# Patient Record
Sex: FEMALE | Race: ASIAN | NOT HISPANIC OR LATINO
[De-identification: names, ages, dates, MRNs, and addresses within clinical notes are randomized per-mention and may not be internally consistent; named-entity substitution may affect disease eponyms.]

---

## 2019-04-24 ENCOUNTER — RESULT REVIEW (OUTPATIENT)
Age: 29
End: 2019-04-24

## 2019-05-29 ENCOUNTER — APPOINTMENT (OUTPATIENT)
Dept: INTERNAL MEDICINE | Facility: CLINIC | Age: 29
End: 2019-05-29
Payer: COMMERCIAL

## 2019-05-29 ENCOUNTER — LABORATORY RESULT (OUTPATIENT)
Age: 29
End: 2019-05-29

## 2019-05-29 VITALS
SYSTOLIC BLOOD PRESSURE: 111 MMHG | WEIGHT: 123 LBS | HEIGHT: 62 IN | DIASTOLIC BLOOD PRESSURE: 76 MMHG | HEART RATE: 54 BPM | BODY MASS INDEX: 22.63 KG/M2 | OXYGEN SATURATION: 99 % | RESPIRATION RATE: 17 BRPM | TEMPERATURE: 98.7 F

## 2019-05-29 DIAGNOSIS — Z82.49 FAMILY HISTORY OF ISCHEMIC HEART DISEASE AND OTHER DISEASES OF THE CIRCULATORY SYSTEM: ICD-10-CM

## 2019-05-29 DIAGNOSIS — Z83.3 FAMILY HISTORY OF DIABETES MELLITUS: ICD-10-CM

## 2019-05-29 DIAGNOSIS — Z78.9 OTHER SPECIFIED HEALTH STATUS: ICD-10-CM

## 2019-05-29 DIAGNOSIS — Z00.00 ENCOUNTER FOR GENERAL ADULT MEDICAL EXAMINATION W/OUT ABNORMAL FINDINGS: ICD-10-CM

## 2019-05-29 DIAGNOSIS — E03.9 HYPOTHYROIDISM, UNSPECIFIED: ICD-10-CM

## 2019-05-29 DIAGNOSIS — Z83.438 FAMILY HISTORY OF OTHER DISORDER OF LIPOPROTEIN METABOLISM AND OTHER LIPIDEMIA: ICD-10-CM

## 2019-05-29 DIAGNOSIS — D22.9 MELANOCYTIC NEVI, UNSPECIFIED: ICD-10-CM

## 2019-05-29 PROCEDURE — 99385 PREV VISIT NEW AGE 18-39: CPT

## 2019-05-30 LAB
25(OH)D3 SERPL-MCNC: 21.3 NG/ML
ALBUMIN SERPL ELPH-MCNC: 4.1 G/DL
ALP BLD-CCNC: 70 U/L
ALT SERPL-CCNC: 12 U/L
ANION GAP SERPL CALC-SCNC: 10 MMOL/L
APPEARANCE: ABNORMAL
AST SERPL-CCNC: 18 U/L
BASOPHILS # BLD AUTO: 0.06 K/UL
BASOPHILS NFR BLD AUTO: 0.7 %
BILIRUB SERPL-MCNC: 0.4 MG/DL
BILIRUBIN URINE: NEGATIVE
BLOOD URINE: NEGATIVE
BUN SERPL-MCNC: 13 MG/DL
CALCIUM SERPL-MCNC: 9.4 MG/DL
CHLORIDE SERPL-SCNC: 103 MMOL/L
CHOLEST SERPL-MCNC: 214 MG/DL
CHOLEST/HDLC SERPL: 3.2 RATIO
CO2 SERPL-SCNC: 28 MMOL/L
COLOR: YELLOW
CREAT SERPL-MCNC: 0.75 MG/DL
EOSINOPHIL # BLD AUTO: 0.39 K/UL
EOSINOPHIL NFR BLD AUTO: 4.4 %
ESTIMATED AVERAGE GLUCOSE: 105 MG/DL
GLUCOSE QUALITATIVE U: NEGATIVE
GLUCOSE SERPL-MCNC: 84 MG/DL
HBA1C MFR BLD HPLC: 5.3 %
HCT VFR BLD CALC: 43.2 %
HDLC SERPL-MCNC: 67 MG/DL
HGB BLD-MCNC: 13.6 G/DL
IMM GRANULOCYTES NFR BLD AUTO: 0.5 %
KETONES URINE: NEGATIVE
LDLC SERPL CALC-MCNC: 126 MG/DL
LEUKOCYTE ESTERASE URINE: NEGATIVE
LYMPHOCYTES # BLD AUTO: 2.75 K/UL
LYMPHOCYTES NFR BLD AUTO: 31 %
MAN DIFF?: NORMAL
MCHC RBC-ENTMCNC: 29.1 PG
MCHC RBC-ENTMCNC: 31.5 GM/DL
MCV RBC AUTO: 92.5 FL
MONOCYTES # BLD AUTO: 0.64 K/UL
MONOCYTES NFR BLD AUTO: 7.2 %
NEUTROPHILS # BLD AUTO: 4.98 K/UL
NEUTROPHILS NFR BLD AUTO: 56.2 %
NITRITE URINE: NEGATIVE
PH URINE: 8.5
PLATELET # BLD AUTO: 300 K/UL
POTASSIUM SERPL-SCNC: 5 MMOL/L
PROT SERPL-MCNC: 6.7 G/DL
PROTEIN URINE: NORMAL
RBC # BLD: 4.67 M/UL
RBC # FLD: 13.4 %
SODIUM SERPL-SCNC: 141 MMOL/L
SPECIFIC GRAVITY URINE: 1.02
T3 SERPL-MCNC: 98 NG/DL
T3FREE SERPL-MCNC: 2.76 PG/ML
T4 FREE SERPL-MCNC: 1.8 NG/DL
T4 SERPL-MCNC: 9.3 UG/DL
TRIGL SERPL-MCNC: 104 MG/DL
TSH SERPL-ACNC: 1.08 UIU/ML
UROBILINOGEN URINE: NORMAL
WBC # FLD AUTO: 8.86 K/UL

## 2019-06-12 ENCOUNTER — TRANSCRIPTION ENCOUNTER (OUTPATIENT)
Age: 29
End: 2019-06-12

## 2019-09-30 ENCOUNTER — RX RENEWAL (OUTPATIENT)
Age: 29
End: 2019-09-30

## 2019-10-02 ENCOUNTER — RX RENEWAL (OUTPATIENT)
Age: 29
End: 2019-10-02

## 2020-02-18 ENCOUNTER — RX RENEWAL (OUTPATIENT)
Age: 30
End: 2020-02-18

## 2020-05-05 ENCOUNTER — APPOINTMENT (OUTPATIENT)
Dept: ALLERGY | Facility: CLINIC | Age: 30
End: 2020-05-05
Payer: COMMERCIAL

## 2020-05-05 VITALS
HEART RATE: 61 BPM | HEIGHT: 62 IN | WEIGHT: 125 LBS | SYSTOLIC BLOOD PRESSURE: 112 MMHG | DIASTOLIC BLOOD PRESSURE: 80 MMHG | BODY MASS INDEX: 23 KG/M2 | RESPIRATION RATE: 16 BRPM | OXYGEN SATURATION: 98 %

## 2020-05-05 DIAGNOSIS — J30.9 ALLERGIC RHINITIS, UNSPECIFIED: ICD-10-CM

## 2020-05-05 PROCEDURE — 95018 ALL TSTG PERQ&IQ DRUGS/BIOL: CPT

## 2020-05-05 PROCEDURE — 95004 PERQ TESTS W/ALRGNC XTRCS: CPT

## 2020-05-05 PROCEDURE — 95024 IQ TESTS W/ALLERGENIC XTRCS: CPT

## 2020-05-05 PROCEDURE — 99204 OFFICE O/P NEW MOD 45 MIN: CPT | Mod: 25

## 2020-05-05 NOTE — PHYSICAL EXAM
[Alert] : alert [Well Nourished] : well nourished [Healthy Appearance] : healthy appearance [No Acute Distress] : no acute distress [Well Developed] : well developed [No Discharge] : no discharge [Normal Pupil & Iris Size/Symmetry] : normal pupil and iris size and symmetry [Sclera Not Icteric] : sclera not icteric [Normal TMs] : both tympanic membranes were normal [Normal Nasal Mucosa] : the nasal mucosa was normal [Normal Lips/Tongue] : the lips and tongue were normal [Normal Tonsils] : normal tonsils [Normal Outer Ear/Nose] : the ears and nose were normal in appearance [No Thrush] : no thrush [Normal Dentition] : normal dentition [No Oral Lesions or Ulcers] : no oral lesions or ulcers [No Neck Mass] : no neck mass was observed [No Thyroid Mass] : no thyroid mass [No LAD] : no lymphadenopathy [Normal Rate and Effort] : normal respiratory rhythm and effort [Supple] : the neck was supple [Normal Palpation] : palpation of the chest revealed no abnormalities [No Crackles] : no crackles [No Retractions] : no retractions [Bilateral Audible Breath Sounds] : bilateral audible breath sounds [Normal Rate] : heart rate was normal  [Normal S1, S2] : normal S1 and S2 [No murmur] : no murmur [Regular Rhythm] : with a regular rhythm [Normal Cervical Lymph Nodes] : cervical [Skin Intact] : skin intact  [No Rash] : no rash [No Skin Lesions] : no skin lesions [No Joint Swelling or Erythema] : no joint swelling or erythema [No clubbing] : no clubbing [No Edema] : no edema [Normal Mood] : mood was normal [No Cyanosis] : no cyanosis [Normal Affect] : affect was normal [Alert, Awake, Oriented as Age-Appropriate] : alert, awake, oriented as age appropriate [Boggy Nasal Turbinates] : no boggy and/or pale nasal turbinates [Posterior Pharyngeal Cobblestoning] : no posterior pharyngeal cobblestoning

## 2020-05-05 NOTE — SOCIAL HISTORY
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

## 2020-05-05 NOTE — HISTORY OF PRESENT ILLNESS
[Asthma] : asthma [Venom Reactions] : venom reactions [Eczematous rashes] : eczematous rashes [Food Allergies] : food allergies [de-identified] : Patient got a shedding dog 1 month ago - after a bath she noted an itchy rash on hands and arms - ocular itching - facial rash with kissing the dog - rhinorrhea and coughing.    She improves with Zyrtec.   She notes mild wheezing.   She does recall SOB and wheeze in the past exposure to Addison Retriever.    She lives in an apartment.   Dog does come into her bedroom and goes on the bed.  \par \par With dust exposure and spring time - rhinorrhea - ocular itching.    \par \par Patient actively trying to become pregnant.

## 2020-05-05 NOTE — IMPRESSION
[Allergy Testing Dust Mite] : dust mites [] : molds [Allergy Testing Trees] : trees [Allergy Testing Cockroach] : cockroach [Allergy Testing Weeds] : weeds [Allergy Testing Cat] : cat [Allergy Testing Grasses] : grasses [Allergy Testing Dog] : dog [FreeTextEntry3] : dog

## 2020-05-05 NOTE — ASSESSMENT
[FreeTextEntry1] : Perennial and seasonal allergic rhinoconjunctivitis:\par \par Dog avoidance measures reviewed\par Zyrtec 10 mg QD\par Budesonide nasal spray 2 puffs QD\par Genteal eye cleanser prn \par Proair 2 puffs QID prn

## 2020-06-25 ENCOUNTER — RESULT REVIEW (OUTPATIENT)
Age: 30
End: 2020-06-25

## 2020-08-19 ENCOUNTER — APPOINTMENT (OUTPATIENT)
Dept: ANTEPARTUM | Facility: CLINIC | Age: 30
End: 2020-08-19
Payer: COMMERCIAL

## 2020-08-19 ENCOUNTER — ASOB RESULT (OUTPATIENT)
Age: 30
End: 2020-08-19

## 2020-08-19 ENCOUNTER — LABORATORY RESULT (OUTPATIENT)
Age: 30
End: 2020-08-19

## 2020-08-19 PROCEDURE — 76813 OB US NUCHAL MEAS 1 GEST: CPT

## 2020-08-19 PROCEDURE — 76801 OB US < 14 WKS SINGLE FETUS: CPT

## 2020-08-19 PROCEDURE — 36416 COLLJ CAPILLARY BLOOD SPEC: CPT

## 2020-09-18 ENCOUNTER — APPOINTMENT (OUTPATIENT)
Dept: ANTEPARTUM | Facility: CLINIC | Age: 30
End: 2020-09-18
Payer: COMMERCIAL

## 2020-09-18 ENCOUNTER — ASOB RESULT (OUTPATIENT)
Age: 30
End: 2020-09-18

## 2020-09-18 PROCEDURE — 76817 TRANSVAGINAL US OBSTETRIC: CPT

## 2020-09-18 PROCEDURE — 76805 OB US >/= 14 WKS SNGL FETUS: CPT

## 2020-10-08 ENCOUNTER — APPOINTMENT (OUTPATIENT)
Dept: ANTEPARTUM | Facility: CLINIC | Age: 30
End: 2020-10-08
Payer: COMMERCIAL

## 2020-10-08 ENCOUNTER — ASOB RESULT (OUTPATIENT)
Age: 30
End: 2020-10-08

## 2020-10-08 PROCEDURE — 76811 OB US DETAILED SNGL FETUS: CPT

## 2020-10-08 PROCEDURE — 36415 COLL VENOUS BLD VENIPUNCTURE: CPT

## 2020-10-08 PROCEDURE — 76817 TRANSVAGINAL US OBSTETRIC: CPT

## 2020-12-03 ENCOUNTER — ASOB RESULT (OUTPATIENT)
Age: 30
End: 2020-12-03

## 2020-12-03 ENCOUNTER — APPOINTMENT (OUTPATIENT)
Dept: ANTEPARTUM | Facility: CLINIC | Age: 30
End: 2020-12-03
Payer: COMMERCIAL

## 2020-12-03 PROCEDURE — 99072 ADDL SUPL MATRL&STAF TM PHE: CPT

## 2020-12-03 PROCEDURE — 76816 OB US FOLLOW-UP PER FETUS: CPT

## 2020-12-07 ENCOUNTER — ASOB RESULT (OUTPATIENT)
Age: 30
End: 2020-12-07

## 2020-12-07 ENCOUNTER — APPOINTMENT (OUTPATIENT)
Dept: MATERNAL FETAL MEDICINE | Facility: CLINIC | Age: 30
End: 2020-12-07
Payer: COMMERCIAL

## 2020-12-07 DIAGNOSIS — O99.810 ABNORMAL GLUCOSE COMPLICATING PREGNANCY: ICD-10-CM

## 2020-12-07 PROCEDURE — G0109 DIAB MANAGE TRN IND/GROUP: CPT | Mod: 95

## 2020-12-21 ENCOUNTER — ASOB RESULT (OUTPATIENT)
Age: 30
End: 2020-12-21

## 2020-12-21 ENCOUNTER — APPOINTMENT (OUTPATIENT)
Dept: MATERNAL FETAL MEDICINE | Facility: CLINIC | Age: 30
End: 2020-12-21
Payer: COMMERCIAL

## 2020-12-21 PROCEDURE — G0108 DIAB MANAGE TRN  PER INDIV: CPT | Mod: 95

## 2021-01-11 ENCOUNTER — APPOINTMENT (OUTPATIENT)
Dept: MATERNAL FETAL MEDICINE | Facility: CLINIC | Age: 31
End: 2021-01-11
Payer: COMMERCIAL

## 2021-01-11 ENCOUNTER — ASOB RESULT (OUTPATIENT)
Age: 31
End: 2021-01-11

## 2021-01-11 PROCEDURE — G0108 DIAB MANAGE TRN  PER INDIV: CPT | Mod: 95

## 2021-01-14 ENCOUNTER — ASOB RESULT (OUTPATIENT)
Age: 31
End: 2021-01-14

## 2021-01-14 ENCOUNTER — APPOINTMENT (OUTPATIENT)
Dept: ANTEPARTUM | Facility: CLINIC | Age: 31
End: 2021-01-14
Payer: COMMERCIAL

## 2021-01-14 PROCEDURE — 76816 OB US FOLLOW-UP PER FETUS: CPT

## 2021-01-14 PROCEDURE — 99072 ADDL SUPL MATRL&STAF TM PHE: CPT

## 2021-02-01 ENCOUNTER — ASOB RESULT (OUTPATIENT)
Age: 31
End: 2021-02-01

## 2021-02-01 ENCOUNTER — APPOINTMENT (OUTPATIENT)
Dept: MATERNAL FETAL MEDICINE | Facility: CLINIC | Age: 31
End: 2021-02-01
Payer: COMMERCIAL

## 2021-02-01 PROCEDURE — G0108 DIAB MANAGE TRN  PER INDIV: CPT | Mod: 95

## 2021-02-05 ENCOUNTER — INPATIENT (INPATIENT)
Facility: HOSPITAL | Age: 31
LOS: 0 days | Discharge: HOME | End: 2021-02-06
Attending: OBSTETRICS & GYNECOLOGY | Admitting: OBSTETRICS & GYNECOLOGY
Payer: COMMERCIAL

## 2021-02-05 VITALS — DIASTOLIC BLOOD PRESSURE: 82 MMHG | HEART RATE: 63 BPM | SYSTOLIC BLOOD PRESSURE: 126 MMHG | TEMPERATURE: 98 F

## 2021-02-05 DIAGNOSIS — E89.0 POSTPROCEDURAL HYPOTHYROIDISM: Chronic | ICD-10-CM

## 2021-02-05 DIAGNOSIS — O26.899 OTHER SPECIFIED PREGNANCY RELATED CONDITIONS, UNSPECIFIED TRIMESTER: ICD-10-CM

## 2021-02-05 DIAGNOSIS — Z3A.00 WEEKS OF GESTATION OF PREGNANCY NOT SPECIFIED: ICD-10-CM

## 2021-02-05 DIAGNOSIS — Z34.80 ENCOUNTER FOR SUPERVISION OF OTHER NORMAL PREGNANCY, UNSPECIFIED TRIMESTER: ICD-10-CM

## 2021-02-05 LAB
BASOPHILS # BLD AUTO: 0.04 K/UL — SIGNIFICANT CHANGE UP (ref 0–0.2)
BASOPHILS NFR BLD AUTO: 0.4 % — SIGNIFICANT CHANGE UP (ref 0–2)
BLD GP AB SCN SERPL QL: POSITIVE — SIGNIFICANT CHANGE UP
EOSINOPHIL # BLD AUTO: 0.1 K/UL — SIGNIFICANT CHANGE UP (ref 0–0.5)
EOSINOPHIL NFR BLD AUTO: 0.9 % — SIGNIFICANT CHANGE UP (ref 0–6)
GLUCOSE BLDC GLUCOMTR-MCNC: 79 MG/DL — SIGNIFICANT CHANGE UP (ref 70–99)
GLUCOSE BLDC GLUCOMTR-MCNC: 83 MG/DL — SIGNIFICANT CHANGE UP (ref 70–99)
GLUCOSE BLDC GLUCOMTR-MCNC: 92 MG/DL — SIGNIFICANT CHANGE UP (ref 70–99)
HCT VFR BLD CALC: 42.1 % — SIGNIFICANT CHANGE UP (ref 34.5–45)
HGB BLD-MCNC: 14.1 G/DL — SIGNIFICANT CHANGE UP (ref 11.5–15.5)
IMM GRANULOCYTES NFR BLD AUTO: 0.5 % — SIGNIFICANT CHANGE UP (ref 0–1.5)
LYMPHOCYTES # BLD AUTO: 29.4 % — SIGNIFICANT CHANGE UP (ref 13–44)
LYMPHOCYTES # BLD AUTO: 3.13 K/UL — SIGNIFICANT CHANGE UP (ref 1–3.3)
MCHC RBC-ENTMCNC: 28.7 PG — SIGNIFICANT CHANGE UP (ref 27–34)
MCHC RBC-ENTMCNC: 33.5 GM/DL — SIGNIFICANT CHANGE UP (ref 32–36)
MCV RBC AUTO: 85.6 FL — SIGNIFICANT CHANGE UP (ref 80–100)
MONOCYTES # BLD AUTO: 0.66 K/UL — SIGNIFICANT CHANGE UP (ref 0–0.9)
MONOCYTES NFR BLD AUTO: 6.2 % — SIGNIFICANT CHANGE UP (ref 2–14)
NEUTROPHILS # BLD AUTO: 6.67 K/UL — SIGNIFICANT CHANGE UP (ref 1.8–7.4)
NEUTROPHILS NFR BLD AUTO: 62.6 % — SIGNIFICANT CHANGE UP (ref 43–77)
NRBC # BLD: 0 /100 WBCS — SIGNIFICANT CHANGE UP (ref 0–0)
PLATELET # BLD AUTO: 288 K/UL — SIGNIFICANT CHANGE UP (ref 150–400)
RBC # BLD: 4.92 M/UL — SIGNIFICANT CHANGE UP (ref 3.8–5.2)
RBC # FLD: 13.5 % — SIGNIFICANT CHANGE UP (ref 10.3–14.5)
RH IG SCN BLD-IMP: NEGATIVE — SIGNIFICANT CHANGE UP
RH IG SCN BLD-IMP: NEGATIVE — SIGNIFICANT CHANGE UP
SARS-COV-2 IGG SERPL QL IA: NEGATIVE — SIGNIFICANT CHANGE UP
SARS-COV-2 IGM SERPL IA-ACNC: 0.67 INDEX — SIGNIFICANT CHANGE UP
SARS-COV-2 RNA SPEC QL NAA+PROBE: SIGNIFICANT CHANGE UP
T PALLIDUM AB TITR SER: NEGATIVE — SIGNIFICANT CHANGE UP
WBC # BLD: 10.65 K/UL — HIGH (ref 3.8–10.5)
WBC # FLD AUTO: 10.65 K/UL — HIGH (ref 3.8–10.5)

## 2021-02-05 PROCEDURE — 86077 PHYS BLOOD BANK SERV XMATCH: CPT

## 2021-02-05 RX ORDER — DIPHENHYDRAMINE HCL 50 MG
25 CAPSULE ORAL EVERY 6 HOURS
Refills: 0 | Status: DISCONTINUED | OUTPATIENT
Start: 2021-02-05 | End: 2021-02-06

## 2021-02-05 RX ORDER — LEVOTHYROXINE SODIUM 125 MCG
75 TABLET ORAL
Refills: 0 | Status: DISCONTINUED | OUTPATIENT
Start: 2021-02-05 | End: 2021-02-06

## 2021-02-05 RX ORDER — OXYTOCIN 10 UNIT/ML
333.33 VIAL (ML) INJECTION
Qty: 20 | Refills: 0 | Status: DISCONTINUED | OUTPATIENT
Start: 2021-02-05 | End: 2021-02-06

## 2021-02-05 RX ORDER — ACETAMINOPHEN 500 MG
975 TABLET ORAL
Refills: 0 | Status: DISCONTINUED | OUTPATIENT
Start: 2021-02-05 | End: 2021-02-06

## 2021-02-05 RX ORDER — OXYTOCIN 10 UNIT/ML
2 VIAL (ML) INJECTION
Qty: 30 | Refills: 0 | Status: DISCONTINUED | OUTPATIENT
Start: 2021-02-05 | End: 2021-02-06

## 2021-02-05 RX ORDER — SODIUM CHLORIDE 9 MG/ML
1000 INJECTION, SOLUTION INTRAVENOUS
Refills: 0 | Status: DISCONTINUED | OUTPATIENT
Start: 2021-02-05 | End: 2021-02-06

## 2021-02-05 RX ORDER — HYDROCORTISONE 1 %
1 OINTMENT (GRAM) TOPICAL EVERY 6 HOURS
Refills: 0 | Status: DISCONTINUED | OUTPATIENT
Start: 2021-02-05 | End: 2021-02-06

## 2021-02-05 RX ORDER — LANOLIN
1 OINTMENT (GRAM) TOPICAL EVERY 6 HOURS
Refills: 0 | Status: DISCONTINUED | OUTPATIENT
Start: 2021-02-05 | End: 2021-02-06

## 2021-02-05 RX ORDER — SODIUM CHLORIDE 9 MG/ML
3 INJECTION INTRAMUSCULAR; INTRAVENOUS; SUBCUTANEOUS EVERY 8 HOURS
Refills: 0 | Status: DISCONTINUED | OUTPATIENT
Start: 2021-02-05 | End: 2021-02-06

## 2021-02-05 RX ORDER — SODIUM CHLORIDE 9 MG/ML
1000 INJECTION, SOLUTION INTRAVENOUS
Refills: 0 | Status: DISCONTINUED | OUTPATIENT
Start: 2021-02-05 | End: 2021-02-05

## 2021-02-05 RX ORDER — IBUPROFEN 200 MG
600 TABLET ORAL EVERY 6 HOURS
Refills: 0 | Status: COMPLETED | OUTPATIENT
Start: 2021-02-05 | End: 2022-01-04

## 2021-02-05 RX ORDER — OXYCODONE HYDROCHLORIDE 5 MG/1
5 TABLET ORAL ONCE
Refills: 0 | Status: DISCONTINUED | OUTPATIENT
Start: 2021-02-05 | End: 2021-02-06

## 2021-02-05 RX ORDER — KETOROLAC TROMETHAMINE 30 MG/ML
30 SYRINGE (ML) INJECTION ONCE
Refills: 0 | Status: DISCONTINUED | OUTPATIENT
Start: 2021-02-05 | End: 2021-02-05

## 2021-02-05 RX ORDER — LEVOTHYROXINE SODIUM 125 MCG
150 TABLET ORAL
Refills: 0 | Status: DISCONTINUED | OUTPATIENT
Start: 2021-02-05 | End: 2021-02-06

## 2021-02-05 RX ORDER — AER TRAVELER 0.5 G/1
1 SOLUTION RECTAL; TOPICAL EVERY 4 HOURS
Refills: 0 | Status: DISCONTINUED | OUTPATIENT
Start: 2021-02-05 | End: 2021-02-06

## 2021-02-05 RX ORDER — CITRIC ACID/SODIUM CITRATE 300-500 MG
15 SOLUTION, ORAL ORAL EVERY 6 HOURS
Refills: 0 | Status: DISCONTINUED | OUTPATIENT
Start: 2021-02-05 | End: 2021-02-05

## 2021-02-05 RX ORDER — IBUPROFEN 200 MG
600 TABLET ORAL EVERY 6 HOURS
Refills: 0 | Status: DISCONTINUED | OUTPATIENT
Start: 2021-02-05 | End: 2021-02-06

## 2021-02-05 RX ORDER — TETANUS TOXOID, REDUCED DIPHTHERIA TOXOID AND ACELLULAR PERTUSSIS VACCINE, ADSORBED 5; 2.5; 8; 8; 2.5 [IU]/.5ML; [IU]/.5ML; UG/.5ML; UG/.5ML; UG/.5ML
0.5 SUSPENSION INTRAMUSCULAR ONCE
Refills: 0 | Status: DISCONTINUED | OUTPATIENT
Start: 2021-02-05 | End: 2021-02-06

## 2021-02-05 RX ORDER — PRAMOXINE HYDROCHLORIDE 150 MG/15G
1 AEROSOL, FOAM RECTAL EVERY 4 HOURS
Refills: 0 | Status: DISCONTINUED | OUTPATIENT
Start: 2021-02-05 | End: 2021-02-06

## 2021-02-05 RX ORDER — OXYCODONE HYDROCHLORIDE 5 MG/1
5 TABLET ORAL
Refills: 0 | Status: DISCONTINUED | OUTPATIENT
Start: 2021-02-05 | End: 2021-02-06

## 2021-02-05 RX ORDER — DIBUCAINE 1 %
1 OINTMENT (GRAM) RECTAL EVERY 6 HOURS
Refills: 0 | Status: DISCONTINUED | OUTPATIENT
Start: 2021-02-05 | End: 2021-02-06

## 2021-02-05 RX ORDER — MAGNESIUM HYDROXIDE 400 MG/1
30 TABLET, CHEWABLE ORAL
Refills: 0 | Status: DISCONTINUED | OUTPATIENT
Start: 2021-02-05 | End: 2021-02-06

## 2021-02-05 RX ORDER — BENZOCAINE 10 %
1 GEL (GRAM) MUCOUS MEMBRANE EVERY 6 HOURS
Refills: 0 | Status: DISCONTINUED | OUTPATIENT
Start: 2021-02-05 | End: 2021-02-06

## 2021-02-05 RX ORDER — OXYTOCIN 10 UNIT/ML
333.33 VIAL (ML) INJECTION
Qty: 20 | Refills: 0 | Status: DISCONTINUED | OUTPATIENT
Start: 2021-02-05 | End: 2021-02-05

## 2021-02-05 RX ORDER — SIMETHICONE 80 MG/1
80 TABLET, CHEWABLE ORAL EVERY 4 HOURS
Refills: 0 | Status: DISCONTINUED | OUTPATIENT
Start: 2021-02-05 | End: 2021-02-06

## 2021-02-05 RX ORDER — SODIUM CHLORIDE 9 MG/ML
1000 INJECTION INTRAMUSCULAR; INTRAVENOUS; SUBCUTANEOUS
Refills: 0 | Status: DISCONTINUED | OUTPATIENT
Start: 2021-02-05 | End: 2021-02-06

## 2021-02-05 RX ADMIN — Medication 30 MILLIGRAM(S): at 13:53

## 2021-02-05 RX ADMIN — Medication 975 MILLIGRAM(S): at 18:12

## 2021-02-05 RX ADMIN — Medication 975 MILLIGRAM(S): at 23:30

## 2021-02-05 RX ADMIN — Medication 600 MILLIGRAM(S): at 21:34

## 2021-02-05 RX ADMIN — Medication 2 MILLIUNIT(S)/MIN: at 08:29

## 2021-02-05 NOTE — OB PROVIDER DELIVERY SUMMARY - NSPROVIDERDELIVERYNOTE_OBGYN_ALL_OB_FT
admitted with srom at 8 cm took her 4 hours to get to fully, minimal pitocin.  baby occiput posterior all the time and delivered at op. pushed for 2 hours.  second degree tear,  uterus was exam-ed and clean

## 2021-02-05 NOTE — OB PROVIDER LABOR PROGRESS NOTE - ASSESSMENT
I exam her after epidural around 6 50AM she was 7 cm, 100%, ctx at 0, lop.  around 8:30 her contractions are 5 min apart was started on low dose Pitocin.  she is progressing vertex now is OA.  continue with peanut ball.  Pitocin is at 4 miu

## 2021-02-05 NOTE — OB PROVIDER H&P - HISTORY OF PRESENT ILLNESS
29yo  @ 37w5d presenting with CTX and SROM at 450am, clear fluid. Denies VB. +FM.    PNC: Simhaee. uncomplicated. no reported genetic/sono abnormalities. EFW 3200. GBS neg.    OBHx:  G1 current    GYNHx: denies fibroids/ov cysts/STIs/abnormal Pap smears    PMSH: h/o papillary thyroid ca s/p thyroidectomy    Meds: Synthroid 150mcg M/T//F/Sa, 75mcg Green/W  All/Intol: Azithro (GI upset)    Soc: denies T/E/D  Psych: denies    Will accept blood products.    T(C): 36.9 (21 @ 06:39), Max: 36.9 (21 @ 06:01)  HR: 77 (21 @ 06:54) (63 - 77)  BP: 110/65 (21 @ 06:56) (106/56 - 126/82)  RR: 16 (21 @ 06:39) (16 - 16)  SpO2: 100% (21 @ 06:54) (83% - 100%)    /mod/+accel/-decel  [place] q3-4min  VE 5.5/80/-3  BSS VTX    soft, gravid  nontender, nonerythematous    - Admit to L&D. Routine labs. IVF.  - IOL with   - GBS   - Fetus cat    D/w  Sandra Tracy R2   29yo  @ 37w5d presenting with CTX and SROM at 450am, clear fluid. Denies VB. +FM.

## 2021-02-05 NOTE — OB PROVIDER H&P - ASSESSMENT
31yo  @ 37w5d presenting with CTX and SROM at 450am, clear fluid. Denies VB. +FM.    PNC: Maurizio. A1. no reported genetic/sono abnormalities. EFW 3200. GBS neg.    OBHx:  G1 current    GYNHx: denies fibroids/ov cysts/STIs/abnormal Pap smears    PMSH: h/o papillary thyroid ca s/p total thyroidectomy (no additional treatments)    Meds: Synthroid 150mcg M/T//F/Sa, 75mcg Green/W  All/Intol: Azithro (GI upset)    Soc: denies T/E/D  Psych: denies    Will accept blood products.    T(C): 36.9 (21 @ 06:39), Max: 36.9 (21 @ 06:01)  HR: 77 (21 @ 06:54) (63 - 77)  BP: 110/65 (21 @ 06:56) (106/56 - 126/82)  RR: 16 (21 @ 06:39) (16 - 16)  SpO2: 100% (21 @ 06:54) (83% - 100%)    /mod/+accel/-decel  [place] q3-4min  VE 5.5/80/-3  BSS VTX    gen: awake, alert, NAD  chest: nonlabored breathing  abd: soft, gravid  ext: nontender, nonerythematous    - Admit to L&D. Routine labs. IVF.  - Anesthesia consult, epidural for pain management  - GBS neg, no amp   - Fetus cat 1  - Expt mgmt  - Synthroid for s/p total thyroidectomy    D/w Dr. Maurizio Tracy R2

## 2021-02-05 NOTE — OB RN DELIVERY SUMMARY - NS_SEPSISRSKCALC_OBGYN_ALL_OB_FT
EOS calculated successfully. EOS Risk Factor: 0.5/1000 live births ([facility] national incidence); GA=37w5d; Temp=98.6; ROM=8.283; GBS='Negative'; Antibiotics='No antibiotics or any antibiotics < 2 hrs prior to birth'

## 2021-02-05 NOTE — OB RN PATIENT PROFILE - NS PRO PT RIGHT SUPPORT PERSON
June 19, 2020          Elaina Valenzuela,  [address]   [place] [address]            Dear Elaina Valenzuela      Your provider has not sent you a refill for hydrochlorothiazide (HYDRODIURIL) 25 MG tablet because you are due for an appointment for further refills. We are currently only able to see select in person visits. We ask that you schedule a telephone visit, video visit or evisit (through Fii[employer]) with your provider.  Please contact the clinic to schedule an appointment for further refills.     Sincerely,       Your [place] Team/HV                      
same name as above

## 2021-02-06 ENCOUNTER — TRANSCRIPTION ENCOUNTER (OUTPATIENT)
Age: 31
End: 2021-02-06

## 2021-02-06 VITALS
DIASTOLIC BLOOD PRESSURE: 64 MMHG | SYSTOLIC BLOOD PRESSURE: 100 MMHG | TEMPERATURE: 98 F | HEART RATE: 53 BPM | RESPIRATION RATE: 17 BRPM | OXYGEN SATURATION: 97 %

## 2021-02-06 PROCEDURE — 86901 BLOOD TYPING SEROLOGIC RH(D): CPT

## 2021-02-06 PROCEDURE — 86780 TREPONEMA PALLIDUM: CPT

## 2021-02-06 PROCEDURE — 86870 RBC ANTIBODY IDENTIFICATION: CPT

## 2021-02-06 PROCEDURE — U0003: CPT

## 2021-02-06 PROCEDURE — 86769 SARS-COV-2 COVID-19 ANTIBODY: CPT

## 2021-02-06 PROCEDURE — 86850 RBC ANTIBODY SCREEN: CPT

## 2021-02-06 PROCEDURE — U0005: CPT

## 2021-02-06 PROCEDURE — 82962 GLUCOSE BLOOD TEST: CPT

## 2021-02-06 PROCEDURE — 85460 HEMOGLOBIN FETAL: CPT

## 2021-02-06 PROCEDURE — 85025 COMPLETE CBC W/AUTO DIFF WBC: CPT

## 2021-02-06 PROCEDURE — 86900 BLOOD TYPING SEROLOGIC ABO: CPT

## 2021-02-06 PROCEDURE — G0463: CPT

## 2021-02-06 PROCEDURE — 59050 FETAL MONITOR W/REPORT: CPT

## 2021-02-06 PROCEDURE — 59025 FETAL NON-STRESS TEST: CPT

## 2021-02-06 RX ORDER — LANOLIN
1 OINTMENT (GRAM) TOPICAL
Qty: 0 | Refills: 0 | DISCHARGE
Start: 2021-02-06

## 2021-02-06 RX ORDER — LEVOTHYROXINE SODIUM 125 MCG
1 TABLET ORAL
Qty: 0 | Refills: 0 | DISCHARGE
Start: 2021-02-06

## 2021-02-06 RX ORDER — ACETAMINOPHEN 500 MG
3 TABLET ORAL
Qty: 0 | Refills: 0 | DISCHARGE
Start: 2021-02-06

## 2021-02-06 RX ORDER — IBUPROFEN 200 MG
1 TABLET ORAL
Qty: 0 | Refills: 0 | DISCHARGE
Start: 2021-02-06

## 2021-02-06 RX ADMIN — Medication 975 MILLIGRAM(S): at 05:44

## 2021-02-06 RX ADMIN — Medication 975 MILLIGRAM(S): at 12:07

## 2021-02-06 RX ADMIN — Medication 600 MILLIGRAM(S): at 02:50

## 2021-02-06 RX ADMIN — Medication 600 MILLIGRAM(S): at 09:17

## 2021-02-06 NOTE — DISCHARGE NOTE OB - CARE PROVIDER_API CALL
Jason Steven J  OBSTETRICS AND GYNECOLOGY  [address], [address]  [place] [address]  Phone: [phone]  Fax: [phone]  Established Patient  Follow Up Time: Routine

## 2021-02-06 NOTE — PROGRESS NOTE ADULT - PROBLEM SELECTOR PLAN 1
Increase OOB  Regular diet  PO Pain protocol  Routine Postpartum Care  RH negative, will give rhogam if baby RH +    Chel Sanchez PA-C
post partum care

## 2021-02-06 NOTE — CHART NOTE - NSCHARTNOTEFT_GEN_A_CORE
Nutrition Services  Diet Rxd- Regular  Dxd- S/P , hx GDM  MD orders written for discharge. Patient visited to review post pregnancy guidelines for GDM management.  Upon visit, Patient being consulted by lactation nurse and distracted with trying to get baby to latch on.  Patient asked that RDN speak with .  Booklet on what to expect after delivery was provided.  Reviewed the importance of getting a follow up GTT and advised on the risk of developing diabetes as Patient ages. Encouraged Patient to follow up with her OB/GYN to schedule appointment.   Also reviewed the benefits of breastfeeding and following a healthful diet.  Reinforced protein, calcium rich diet as well as maintaining hydration and drinking water and fluids throughout the day.  Continue with prenatal vitamin to supplement.  Patient and  appreciative and able to teach back points.  Plan:  Regular diet  Encourage healthful meal planning with nursing.    Kellen Alba MA,RD,CHES,CDN  Beeper [phone]

## 2021-02-06 NOTE — DISCHARGE NOTE OB - ADDITIONAL INSTRUCTIONS
follow up in 6 weeks, call if heavy vaginal bleeding, headache, postpartum depression, difficulty breathing

## 2021-02-06 NOTE — PROGRESS NOTE ADULT - ASSESSMENT
A: 30y PPD#1 s/p  doing well.    Plan:  Routine postpartum care   Encouraged out of bed  Regular diet

## 2021-02-06 NOTE — PROGRESS NOTE ADULT - ASSESSMENT
A/P:  30y  PPD # 1      S/P                       doing well      Current Issues: none  PAST MEDICAL & SURGICAL HISTORY:  Papillary thyroid carcinoma    H/O total thyroidectomy

## 2021-02-06 NOTE — DISCHARGE NOTE OB - MEDICATION SUMMARY - MEDICATIONS TO TAKE
I will START or STAY ON the medications listed below when I get home from the hospital:    acetaminophen 325 mg oral tablet  -- 3 tab(s) by mouth   -- Indication: For for moderate pain     ibuprofen 600 mg oral tablet  -- 1 tab(s) by mouth every 6 hours  -- Indication: For for sever pain    lanolin topical ointment  -- 1 application on skin every 6 hours, As needed, nipple soreness  -- Indication: For for breast feeding    Prenatal Multivitamins with Folic Acid 1 mg oral tablet  -- 1 tab(s) by mouth once a day  -- Indication: For for breast feeding    levothyroxine 75 mcg (0.075 mg) oral tablet  -- 1 tab(s) by mouth   -- Indication: For for hypthyeroid    levothyroxine 150 mcg (0.15 mg) oral tablet  -- 1 tab(s) by mouth   -- Indication: For for hypothyroid

## 2021-02-06 NOTE — PROGRESS NOTE ADULT - SUBJECTIVE AND OBJECTIVE BOX
Postpartum Note- PPD#1    Allergies    azithromycin (Stomach Upset)    Blood Type O   Negative    RPR : Negative    Rubella:   S:Patient is a  30y           PPD#1         S/P     Patient w/o complaints, pain is controlled.    Pt is OOB, tolerating PO, passing flatus. Lochia WNL.     Feeding: Breast    O:  Vital Signs Last 24 Hrs  T(C): 36.4 (2021 09:15), Max: 37.2 (2021 14:50)  T(F): 97.6 (2021 09:15), Max: 99 (2021 14:50)  HR: 53 (2021 09:15) (50 - 124)  BP: 100/64 (2021 09:15) (100/64 - 131/62)  BP(mean): 84 (2021 15:30) (75 - 84)  RR: 17 (2021 09:15) (17 - 18)  SpO2: 97% (2021 09:15) (84% - 100%)     Gen: NAD  Abdomen: Soft, nontender, non-distended, fundus firm.  Vaginal: Lochia WNL  Ext:  Neg calf tenderness    LABS:    Hemoglobin: 14.1 g/dL ( @ 06:39)      Hematocrit: 42.1 % ( @ 06:39)              
S: Patient doing well. No complaints. Minimal lochia. Pain controlled.  she is breast feeding    O: Vital Signs Last 24 Hrs  T(C): 36.4 (06 Feb 2021 09:15), Max: 37.2 (05 Feb 2021 14:50)  T(F): 97.6 (06 Feb 2021 09:15), Max: 99 (05 Feb 2021 14:50)  HR: 53 (06 Feb 2021 09:15) (50 - 63)  BP: 100/64 (06 Feb 2021 09:15) (100/64 - 119/73)  BP(mean): 84 (05 Feb 2021 15:30) (75 - 84)  RR: 17 (06 Feb 2021 09:15) (17 - 18)  SpO2: 97% (06 Feb 2021 09:15) (90% - 100%)    Gen: NAD  Abd: soft, Nontender, Nondistended, fundus firm  Ext: no tendern, mild edema    Labs:                        14.1   10.65 )-----------( 288      ( 05 Feb 2021 06:39 )             42.1

## 2021-02-06 NOTE — DISCHARGE NOTE NEWBORN - PATIENT PORTAL LINK FT
You can access the FollowMyHealth Patient Portal offered by [facility] by registering at the following website: [email or web address]. By joining [employer]’s FollowMyHealth portal, you will also be able to view your health information using other applications (apps) compatible with our system.

## 2021-02-06 NOTE — DISCHARGE NOTE OB - PATIENT PORTAL LINK FT
You can access the FollowMyHealth Patient Portal offered by [facility] by registering at the following website: [email or web address]. By joining [employer]’s FollowMyHealth portal, you will also be able to view your health information using other applications (apps) compatible with our system.

## 2021-02-06 NOTE — DISCHARGE NOTE OB - CARE PLAN
Principal Discharge DX:	Vaginal delivery  Goal:	safe delivery of baby, post partum care  Assessment and plan of treatment:	post partum care.  viable baby boy

## 2021-02-07 LAB — KLEIHAUER-BETKE CALCULATION: 0 % — SIGNIFICANT CHANGE UP (ref 0–0.3)

## 2021-02-11 ENCOUNTER — APPOINTMENT (OUTPATIENT)
Dept: ANTEPARTUM | Facility: CLINIC | Age: 31
End: 2021-02-11

## 2021-07-16 ENCOUNTER — RESULT REVIEW (OUTPATIENT)
Age: 31
End: 2021-07-16

## 2021-11-08 ENCOUNTER — APPOINTMENT (OUTPATIENT)
Dept: CARDIOLOGY | Facility: CLINIC | Age: 31
End: 2021-11-08
Payer: COMMERCIAL

## 2021-11-08 ENCOUNTER — EMERGENCY (EMERGENCY)
Facility: HOSPITAL | Age: 31
LOS: 1 days | Discharge: HOME | End: 2021-11-08
Attending: EMERGENCY MEDICINE
Payer: COMMERCIAL

## 2021-11-08 VITALS
HEART RATE: 61 BPM | TEMPERATURE: 98 F | RESPIRATION RATE: 20 BRPM | DIASTOLIC BLOOD PRESSURE: 66 MMHG | OXYGEN SATURATION: 100 % | SYSTOLIC BLOOD PRESSURE: 109 MMHG

## 2021-11-08 VITALS
DIASTOLIC BLOOD PRESSURE: 69 MMHG | SYSTOLIC BLOOD PRESSURE: 102 MMHG | HEART RATE: 62 BPM | OXYGEN SATURATION: 99 % | BODY MASS INDEX: 20.83 KG/M2 | WEIGHT: 125 LBS | HEIGHT: 65 IN

## 2021-11-08 VITALS
DIASTOLIC BLOOD PRESSURE: 83 MMHG | RESPIRATION RATE: 18 BRPM | SYSTOLIC BLOOD PRESSURE: 124 MMHG | HEIGHT: 61 IN | WEIGHT: 125 LBS | HEART RATE: 80 BPM | OXYGEN SATURATION: 95 % | TEMPERATURE: 98 F

## 2021-11-08 DIAGNOSIS — E78.5 HYPERLIPIDEMIA, UNSPECIFIED: ICD-10-CM

## 2021-11-08 DIAGNOSIS — R07.9 CHEST PAIN, UNSPECIFIED: ICD-10-CM

## 2021-11-08 DIAGNOSIS — E89.0 POSTPROCEDURAL HYPOTHYROIDISM: Chronic | ICD-10-CM

## 2021-11-08 DIAGNOSIS — Z82.49 FAMILY HISTORY OF ISCHEMIC HEART DISEASE AND OTHER DISEASES OF THE CIRCULATORY SYSTEM: ICD-10-CM

## 2021-11-08 PROBLEM — C73 MALIGNANT NEOPLASM OF THYROID GLAND: Chronic | Status: ACTIVE | Noted: 2021-02-05

## 2021-11-08 LAB
ALBUMIN SERPL ELPH-MCNC: 4.1 G/DL — SIGNIFICANT CHANGE UP (ref 3.3–5)
ALP SERPL-CCNC: 92 U/L — SIGNIFICANT CHANGE UP (ref 40–120)
ALT FLD-CCNC: 13 U/L — SIGNIFICANT CHANGE UP (ref 10–45)
ANION GAP SERPL CALC-SCNC: 15 MMOL/L — SIGNIFICANT CHANGE UP (ref 5–17)
APPEARANCE UR: CLEAR — SIGNIFICANT CHANGE UP
AST SERPL-CCNC: 23 U/L — SIGNIFICANT CHANGE UP (ref 10–40)
BACTERIA # UR AUTO: NEGATIVE — SIGNIFICANT CHANGE UP
BASE EXCESS BLDV CALC-SCNC: 2.5 MMOL/L — HIGH (ref -2–2)
BASOPHILS # BLD AUTO: 0.03 K/UL — SIGNIFICANT CHANGE UP (ref 0–0.2)
BASOPHILS NFR BLD AUTO: 0.4 % — SIGNIFICANT CHANGE UP (ref 0–2)
BILIRUB SERPL-MCNC: 0.4 MG/DL — SIGNIFICANT CHANGE UP (ref 0.2–1.2)
BILIRUB UR-MCNC: NEGATIVE — SIGNIFICANT CHANGE UP
BUN SERPL-MCNC: 14 MG/DL — SIGNIFICANT CHANGE UP (ref 7–23)
CA-I SERPL-SCNC: 1.13 MMOL/L — LOW (ref 1.15–1.33)
CALCIUM SERPL-MCNC: 8.9 MG/DL — SIGNIFICANT CHANGE UP (ref 8.4–10.5)
CHLORIDE BLDV-SCNC: 100 MMOL/L — SIGNIFICANT CHANGE UP (ref 96–108)
CHLORIDE SERPL-SCNC: 98 MMOL/L — SIGNIFICANT CHANGE UP (ref 96–108)
CO2 BLDV-SCNC: 30 MMOL/L — HIGH (ref 22–26)
CO2 SERPL-SCNC: 22 MMOL/L — SIGNIFICANT CHANGE UP (ref 22–31)
COD CRY URNS QL: ABNORMAL
COLOR SPEC: YELLOW — SIGNIFICANT CHANGE UP
CREAT SERPL-MCNC: 0.76 MG/DL — SIGNIFICANT CHANGE UP (ref 0.5–1.3)
DIFF PNL FLD: ABNORMAL
EOSINOPHIL # BLD AUTO: 0.11 K/UL — SIGNIFICANT CHANGE UP (ref 0–0.5)
EOSINOPHIL NFR BLD AUTO: 1.4 % — SIGNIFICANT CHANGE UP (ref 0–6)
EPI CELLS # UR: 3 — SIGNIFICANT CHANGE UP
GAS PNL BLDV: 131 MMOL/L — LOW (ref 136–145)
GAS PNL BLDV: SIGNIFICANT CHANGE UP
GAS PNL BLDV: SIGNIFICANT CHANGE UP
GLUCOSE BLDV-MCNC: 97 MG/DL — SIGNIFICANT CHANGE UP (ref 70–99)
GLUCOSE SERPL-MCNC: 101 MG/DL — HIGH (ref 70–99)
GLUCOSE UR QL: NEGATIVE — SIGNIFICANT CHANGE UP
HCO3 BLDV-SCNC: 29 MMOL/L — SIGNIFICANT CHANGE UP (ref 22–29)
HCT VFR BLD CALC: 44.2 % — SIGNIFICANT CHANGE UP (ref 34.5–45)
HCT VFR BLDA CALC: 45 % — SIGNIFICANT CHANGE UP (ref 34.5–46.5)
HGB BLD CALC-MCNC: 15.1 G/DL — SIGNIFICANT CHANGE UP (ref 11.7–16.1)
HGB BLD-MCNC: 14.6 G/DL — SIGNIFICANT CHANGE UP (ref 11.5–15.5)
HOROWITZ INDEX BLDV+IHG-RTO: SIGNIFICANT CHANGE UP
HYALINE CASTS # UR AUTO: 1 /LPF — SIGNIFICANT CHANGE UP (ref 0–7)
IMM GRANULOCYTES NFR BLD AUTO: 0.4 % — SIGNIFICANT CHANGE UP (ref 0–1.5)
KETONES UR-MCNC: ABNORMAL
LACTATE BLDV-MCNC: 0.9 MMOL/L — SIGNIFICANT CHANGE UP (ref 0.7–2)
LEUKOCYTE ESTERASE UR-ACNC: NEGATIVE — SIGNIFICANT CHANGE UP
LIDOCAIN IGE QN: 27 U/L — SIGNIFICANT CHANGE UP (ref 7–60)
LYMPHOCYTES # BLD AUTO: 1.86 K/UL — SIGNIFICANT CHANGE UP (ref 1–3.3)
LYMPHOCYTES # BLD AUTO: 23.5 % — SIGNIFICANT CHANGE UP (ref 13–44)
MAGNESIUM SERPL-MCNC: 1.9 MG/DL — SIGNIFICANT CHANGE UP (ref 1.6–2.6)
MCHC RBC-ENTMCNC: 28.9 PG — SIGNIFICANT CHANGE UP (ref 27–34)
MCHC RBC-ENTMCNC: 33 GM/DL — SIGNIFICANT CHANGE UP (ref 32–36)
MCV RBC AUTO: 87.5 FL — SIGNIFICANT CHANGE UP (ref 80–100)
MONOCYTES # BLD AUTO: 0.91 K/UL — HIGH (ref 0–0.9)
MONOCYTES NFR BLD AUTO: 11.5 % — SIGNIFICANT CHANGE UP (ref 2–14)
NEUTROPHILS # BLD AUTO: 4.99 K/UL — SIGNIFICANT CHANGE UP (ref 1.8–7.4)
NEUTROPHILS NFR BLD AUTO: 62.8 % — SIGNIFICANT CHANGE UP (ref 43–77)
NITRITE UR-MCNC: NEGATIVE — SIGNIFICANT CHANGE UP
NRBC # BLD: 0 /100 WBCS — SIGNIFICANT CHANGE UP (ref 0–0)
PCO2 BLDV: 50 MMHG — HIGH (ref 39–42)
PH BLDV: 7.37 — SIGNIFICANT CHANGE UP (ref 7.32–7.43)
PH UR: 6 — SIGNIFICANT CHANGE UP (ref 5–8)
PLATELET # BLD AUTO: 321 K/UL — SIGNIFICANT CHANGE UP (ref 150–400)
PO2 BLDV: 18 MMHG — LOW (ref 25–45)
POTASSIUM BLDV-SCNC: 3.4 MMOL/L — LOW (ref 3.5–5.1)
POTASSIUM SERPL-MCNC: 3.4 MMOL/L — LOW (ref 3.5–5.3)
POTASSIUM SERPL-SCNC: 3.4 MMOL/L — LOW (ref 3.5–5.3)
PROT SERPL-MCNC: 7.3 G/DL — SIGNIFICANT CHANGE UP (ref 6–8.3)
PROT UR-MCNC: ABNORMAL
RBC # BLD: 5.05 M/UL — SIGNIFICANT CHANGE UP (ref 3.8–5.2)
RBC # FLD: 12.6 % — SIGNIFICANT CHANGE UP (ref 10.3–14.5)
RBC CASTS # UR COMP ASSIST: 2 /HPF — SIGNIFICANT CHANGE UP (ref 0–4)
SAO2 % BLDV: 26.1 % — LOW (ref 67–88)
SARS-COV-2 RNA SPEC QL NAA+PROBE: SIGNIFICANT CHANGE UP
SODIUM SERPL-SCNC: 135 MMOL/L — SIGNIFICANT CHANGE UP (ref 135–145)
SP GR SPEC: 1.03 — HIGH (ref 1.01–1.02)
TROPONIN T, HIGH SENSITIVITY RESULT: <6 NG/L — SIGNIFICANT CHANGE UP (ref 0–51)
TROPONIN T, HIGH SENSITIVITY RESULT: <6 NG/L — SIGNIFICANT CHANGE UP (ref 0–51)
TSH SERPL-MCNC: 0.68 UIU/ML — SIGNIFICANT CHANGE UP (ref 0.27–4.2)
UROBILINOGEN FLD QL: NEGATIVE — SIGNIFICANT CHANGE UP
WBC # BLD: 7.93 K/UL — SIGNIFICANT CHANGE UP (ref 3.8–10.5)
WBC # FLD AUTO: 7.93 K/UL — SIGNIFICANT CHANGE UP (ref 3.8–10.5)
WBC UR QL: 2 /HPF — SIGNIFICANT CHANGE UP (ref 0–5)

## 2021-11-08 PROCEDURE — 81001 URINALYSIS AUTO W/SCOPE: CPT

## 2021-11-08 PROCEDURE — 87635 SARS-COV-2 COVID-19 AMP PRB: CPT

## 2021-11-08 PROCEDURE — 84132 ASSAY OF SERUM POTASSIUM: CPT

## 2021-11-08 PROCEDURE — 96374 THER/PROPH/DIAG INJ IV PUSH: CPT

## 2021-11-08 PROCEDURE — 83605 ASSAY OF LACTIC ACID: CPT

## 2021-11-08 PROCEDURE — 82947 ASSAY GLUCOSE BLOOD QUANT: CPT

## 2021-11-08 PROCEDURE — 71045 X-RAY EXAM CHEST 1 VIEW: CPT

## 2021-11-08 PROCEDURE — 96375 TX/PRO/DX INJ NEW DRUG ADDON: CPT

## 2021-11-08 PROCEDURE — 82803 BLOOD GASES ANY COMBINATION: CPT

## 2021-11-08 PROCEDURE — 80053 COMPREHEN METABOLIC PANEL: CPT

## 2021-11-08 PROCEDURE — 84484 ASSAY OF TROPONIN QUANT: CPT

## 2021-11-08 PROCEDURE — 85018 HEMOGLOBIN: CPT

## 2021-11-08 PROCEDURE — 99284 EMERGENCY DEPT VISIT MOD MDM: CPT | Mod: 25

## 2021-11-08 PROCEDURE — 85025 COMPLETE CBC W/AUTO DIFF WBC: CPT

## 2021-11-08 PROCEDURE — 99285 EMERGENCY DEPT VISIT HI MDM: CPT

## 2021-11-08 PROCEDURE — 93005 ELECTROCARDIOGRAM TRACING: CPT

## 2021-11-08 PROCEDURE — 85014 HEMATOCRIT: CPT

## 2021-11-08 PROCEDURE — 83690 ASSAY OF LIPASE: CPT

## 2021-11-08 PROCEDURE — 82330 ASSAY OF CALCIUM: CPT

## 2021-11-08 PROCEDURE — 84295 ASSAY OF SERUM SODIUM: CPT

## 2021-11-08 PROCEDURE — 93000 ELECTROCARDIOGRAM COMPLETE: CPT

## 2021-11-08 PROCEDURE — 71045 X-RAY EXAM CHEST 1 VIEW: CPT | Mod: 26

## 2021-11-08 PROCEDURE — 99203 OFFICE O/P NEW LOW 30 MIN: CPT

## 2021-11-08 PROCEDURE — 83735 ASSAY OF MAGNESIUM: CPT

## 2021-11-08 PROCEDURE — 87086 URINE CULTURE/COLONY COUNT: CPT

## 2021-11-08 PROCEDURE — 82435 ASSAY OF BLOOD CHLORIDE: CPT

## 2021-11-08 PROCEDURE — 84443 ASSAY THYROID STIM HORMONE: CPT

## 2021-11-08 RX ORDER — FAMOTIDINE 10 MG/ML
20 INJECTION INTRAVENOUS ONCE
Refills: 0 | Status: COMPLETED | OUTPATIENT
Start: 2021-11-08 | End: 2021-11-08

## 2021-11-08 RX ORDER — ONDANSETRON 8 MG/1
4 TABLET, FILM COATED ORAL ONCE
Refills: 0 | Status: COMPLETED | OUTPATIENT
Start: 2021-11-08 | End: 2021-11-08

## 2021-11-08 RX ORDER — ACETAMINOPHEN 500 MG
975 TABLET ORAL ONCE
Refills: 0 | Status: COMPLETED | OUTPATIENT
Start: 2021-11-08 | End: 2021-11-08

## 2021-11-08 RX ORDER — SODIUM CHLORIDE 9 MG/ML
1000 INJECTION INTRAMUSCULAR; INTRAVENOUS; SUBCUTANEOUS ONCE
Refills: 0 | Status: COMPLETED | OUTPATIENT
Start: 2021-11-08 | End: 2021-11-08

## 2021-11-08 RX ADMIN — FAMOTIDINE 20 MILLIGRAM(S): 10 INJECTION INTRAVENOUS at 01:09

## 2021-11-08 RX ADMIN — Medication 975 MILLIGRAM(S): at 02:53

## 2021-11-08 RX ADMIN — ONDANSETRON 4 MILLIGRAM(S): 8 TABLET, FILM COATED ORAL at 01:33

## 2021-11-08 RX ADMIN — SODIUM CHLORIDE 1000 MILLILITER(S): 9 INJECTION INTRAMUSCULAR; INTRAVENOUS; SUBCUTANEOUS at 01:09

## 2021-11-08 RX ADMIN — Medication 30 MILLILITER(S): at 02:53

## 2021-11-08 NOTE — ED PROVIDER NOTE - PATIENT PORTAL LINK FT
You can access the FollowMyHealth Patient Portal offered by [facility] by registering at the following website: [email or web address]. By joining [employer]’s FollowMyHealth portal, you will also be able to view your health information using other applications (apps) compatible with our system.

## 2021-11-08 NOTE — ED ADULT NURSE REASSESSMENT NOTE - NS ED NURSE REASSESS COMMENT FT1
Pt resting comfortably in stretcher, NSR 60's on cardiac monitor. Says she feels much better after Tylenol & Maalox. MD Jorge at bedside speaking with patient about plan of care

## 2021-11-08 NOTE — ED PROVIDER NOTE - PROGRESS NOTE DETAILS
Attending Gilberto:  pt has suddle t wave inversions on repeat ekg, her pain is atypical and low rf however given fam hx, cp, sob, ekg changes we did recommend she stay for admission. pt has child at home she needs to care for and wants to go home. pt will stay for repeat trop. Cesar Patel PGY1: Results thus far reviewed. Results discussed with pt. Pt states that their symptoms have improved after maalox, IVF, and famotidine. Trop negative x 2. Given ECG changes, symptoms, and family hx, recommended admission for further testing, however pt states that she does not want to stay because she has a 9 month old child at home that she needs to take care of, even provided the risks of death and severe disability - which she understands. Pt to return home with cardiology follow-up as soon as possible. Pt understands that she needs to be seen by [facility] cardiology at the soonest available appointment. Pt provided with strict return precautions, and understands reasons to return to the ER.

## 2021-11-08 NOTE — ED ADULT NURSE NOTE - NSIMPLEMENTINTERV_GEN_ALL_ED
Implemented All Universal Safety Interventions:  [place] to call system. Call bell, personal items and telephone within reach. Instruct patient to call for assistance. Room bathroom lighting operational. Non-slip footwear when patient is off stretcher. Physically safe environment: no spills, clutter or unnecessary equipment. Stretcher in lowest position, wheels locked, appropriate side rails in place.

## 2021-11-08 NOTE — ED PROVIDER NOTE - NS ED ROS FT
Gen: Denies fever.   HEENT: Denies headache. Denies congestion.  CV: +chest pain. Denies lightheadedness.  Skin: Denies rash.   Resp: +SOB. Denies cough.  GI: +abd pain. +nausea. +vomiting. +diarrhea. Denies melena. Denies hematochezia.  Msk: Denies extremity swelling. Denies extremity pain.  : Denies dysuria. Denies hematuria.  Neuro: Denies LOC. Denies dizziness. Denies new numbness/tingling. Denies new focal weakness.  Psych: Denies SI

## 2021-11-08 NOTE — HISTORY OF PRESENT ILLNESS
[FreeTextEntry1] : LEONILA PIZANO 32 yo , hx hypothyroid s/p thyroidectomy\par Friday am she was having a double shot of espresso with oat milk began to feel nauseas, has a prior hx of nausea with coffee on empty stomach\par had vomiting, + diarrhea until Saturday\par no further vomiting but reduced po intake, \par Saturday evening chest tightness began\par but GI burning stopped neck to shoulders in and radiated back\par improved with heat pad, massage, hot shower\par + diarrhea\par \par ED visit\par symptoms improved with tylenol and maylox \par \par EKG NSR with non spec Twave fattening\par \par Family history CAD in her father in his early 50s \par paternal hx of early onset CAD and MI\par \par lipids 2019

## 2021-11-08 NOTE — ED ADULT NURSE NOTE - OBJECTIVE STATEMENT
Pt is a 32 y/o female pmhx Thyroidectomy (2015) & peptic ulcers presents to the ED complaining of chest heaviness. Pt says on Friday she drank an espresso and then afterwords she began having n/v/d & upper abdominal pain she describes as twisting. Says her abdominal pain has since improved, last vomited yesterday & is currently still having watery diarrhea. Has had trouble tolerating PO. Then yesterday at 5pm began having a chest heaviness across her upper chest, b/l shoulder & upper back, states she was at rest when it started. Endorses having THOMPSON. Hot showers & pepcid help with chest discomfort & belly pain. Did take amoxicillin last week for congestion. Pt presents well appearing, alert & oriented x 4, calm, able to follow commands, speech clear. Breathing spontaneous & nonlabored. On cardiac monitor, NSR 70's. Abdomen soft & nondistended. Strength 5/5 x 4 extremities, ambulates without assist. Strong peripheral pulses noted b/l, no edema noted. Skin warm, clean, dry & intact. Denies fever, chills, cough, rhinorrhea, sick contacts, changes in vision. Call bell within reach, bed in lowest position, side rails up, wheels locked.

## 2021-11-08 NOTE — ED PROVIDER NOTE - PHYSICAL EXAMINATION
Gen: A&Ox4   HEENT: Atraumatic. Mucous membranes moist, no scleral icterus.  CV: RRR. No significant lower extremity edema. Radial pulses 2+ bilat. DP/PT pulses 2+ bilat.  Resp: Respirations unlabored. CTAB, no rales, no wheezes.  GI: Abdomen mildly ttp in epigastrium, otherwise non tender to palpation, soft and non-distended.   Skin/MSK: No open wounds. No ecchymosis appreciated.  Neuro: Following commands. No facial drop.   Psych: Appropriate mood, cooperative

## 2021-11-08 NOTE — ED PROVIDER NOTE - CLINICAL SUMMARY MEDICAL DECISION MAKING FREE TEXT BOX
32 yo F hx of PUD, presenting for c/o diffuse chest pressure radiating to bilateral shoulders and back after eating this evening. Associated shortness of breath and nausea, and epigastric abd pain. Says that these symptoms are worse with exertion. Pt states these symptoms were preceded by 2 days of N/V and diarrhea, which began after drinking a double espresso drink. Pt denies hx of cardiac disease. No hx of smoking. Father w/ hx of significant cardiac disease in 50s. Exam as above. Concern for food born illness, gastritis, gastroenteritis, colitis, PUD. Less likely pancreatitis, gallbladder etiology. Less likely pna, bronchitis. Given family hx, will check serial trop. Low concern for PE given HR and 100% on RA. Labs, ecg, cxr, symptomatic tx, will reassess.

## 2021-11-08 NOTE — REVIEW OF SYSTEMS
[SOB] : no shortness of breath [Dyspnea on exertion] : not dyspnea during exertion [Chest Discomfort] : chest discomfort [Leg Claudication] : no intermittent leg claudication [Palpitations] : no palpitations [Orthopnea] : no orthopnea [PND] : no PND [Syncope] : no syncope [Abdominal Pain] : abdominal pain [Nausea] : nausea [Vomiting] : vomiting [Heartburn] : heartburn [Dysphagia] : no dysphagia [Diarrhea] : diarrhea [Negative] : Psychiatric

## 2021-11-08 NOTE — ED PROVIDER NOTE - NSFOLLOWUPCLINICS_GEN_ALL_ED_FT
[facility]  Cardiology  [address] [place] [id number]  Phone: [phone]  Fax:   Follow Up Time: Urgent

## 2021-11-08 NOTE — ED PROVIDER NOTE - NSFOLLOWUPINSTRUCTIONS_ED_ALL_ED_FT
1. You presented to the emergency department for:  chest pain    2. Your evaluation in the emergency department included a physician evaluation and testing consisting of: lab work, chest xray, and ecg. Your work-up did not reveal any findings indicating the need for admission to the hospital or any emergent interventions at this time.     3. It is recommended that you follow-up with cardiology as soon as possible as discussed for a repeat evaluation, and potentially further testing and treatment.     If needed, to arrange an appointment with a primary care provider please call: [phone]    4. Please continue taking your regular medications as prescribed.     For pain you may take 500-1000mg Tylenol every 8 hours - as needed.  This is an over-the-counter medication - please read the instructions for use and warnings on the label. If you have any questions regarding its use, you may refer them to your local pharmacist.    5. PLEASE RETURN TO THE EMERGENCY DEPARTMENT IMMEDIATELY IF you develop any fevers not responding to over the counter medications, uncontrollable nausea and vomiting, an inability to tolerate eating and drinking, difficulty breathing, chest pain, a severe increase in your symptoms or pain, or any other new symptoms that concern you. 1. You presented to the emergency department for:  chest pain    2. Your evaluation in the emergency department included a physician evaluation and testing consisting of: lab work, chest xray, and ecg. Results of your tests are available in your paper work.     3. It is recommended that you follow-up with cardiology as soon as possible as discussed for a repeat evaluation, and potentially further testing and treatment.     If needed, to arrange an appointment with a primary care provider please call: [phone]    4. Please continue taking your regular medications as prescribed.     5. PLEASE RETURN TO THE EMERGENCY DEPARTMENT IMMEDIATELY IF you develop any fevers not responding to over the counter medications, uncontrollable nausea and vomiting, an inability to tolerate eating and drinking, difficulty breathing, chest pain, a severe increase in your symptoms or pain, or any other new symptoms that concern you.

## 2021-11-08 NOTE — ED PROVIDER NOTE - OBJECTIVE STATEMENT
32 yo F hx of PUD, presenting for c/o diffuse chest pressure radiating to bilateral shoulders and back after eating this evening. Associated shortness of breath and nausea, and epigastric abd pain. Says that these symptoms are worse with exertion. Pt states these symptoms were preceded by 2 days of N/V and diarrhea, which began after drinking a double espresso drink. Pt denies hx of cardiac disease. No hx of smoking. Father w/ hx of significant cardiac disease in 50s. She denies changes in urination, cough, fevers, congestion, hematochezia, and melena. Pt taking Nexium w/o significant relief.

## 2021-11-08 NOTE — ED PROVIDER NOTE - ATTENDING CONTRIBUTION TO CARE
MD Macias:  patient seen and evaluated personally.   I agree with the History & Physical,  Impression & Plan other than what was detailed in my note.  MD Macias  32 y/o f w/ hx of gastritis/ and reported ulcers, has been having typical epigastric pain but now having chest pain as well that feels like pressure going across her whole chest, feels better when bf massages it, worse w/ lying down, some mild assoc "feeling winded", also mild back pain, had nausea but this w/ assoc w/ epigastric pain, did have sinus congestion but no other recent illness, no f/c lower abd pain, ha bv, body aches, no runny nose, no recent surg, no birth control, no hx of dvt/pe, no leg swelling/pain, afebrile vitals stable,  non toxic well appearing, NC/AT,  conjunctiva non conjected, sclera anicteric, moist mucous membranes, neck supple, heart sounds, normal, no mrg, lungs cta b/l no wrr, abd soft non distended w/ no tenderness, no visual deformities of extremities, axox3, , normal mood and affect, ekg shows no evidence of stemi/pericarditis, plan to get cbc ,cmp, trop, probable costochondritis vs gerd however will get screening exam. if neg likely dc

## 2021-11-08 NOTE — ED ADULT TRIAGE NOTE - CHIEF COMPLAINT QUOTE
Chest pain with radiation to the back starting yesterday accompanied by SOB.  Also reports having nausea and multiple episodes of diarrhea everyday since Friday.

## 2021-11-08 NOTE — ED PROVIDER NOTE - NSPTACCESSSVCSAPPTDETAILS_ED_ALL_ED_FT
Please call to arrange follow up with cardiology as soon as possible for patient seen in the ER with chest pain.

## 2021-11-10 ENCOUNTER — APPOINTMENT (OUTPATIENT)
Dept: GASTROENTEROLOGY | Facility: CLINIC | Age: 31
End: 2021-11-10
Payer: COMMERCIAL

## 2021-11-10 VITALS
TEMPERATURE: 97.5 F | WEIGHT: 121 LBS | BODY MASS INDEX: 20.16 KG/M2 | SYSTOLIC BLOOD PRESSURE: 124 MMHG | DIASTOLIC BLOOD PRESSURE: 90 MMHG | HEIGHT: 65 IN

## 2021-11-10 DIAGNOSIS — R10.13 EPIGASTRIC PAIN: ICD-10-CM

## 2021-11-10 DIAGNOSIS — R11.2 NAUSEA WITH VOMITING, UNSPECIFIED: ICD-10-CM

## 2021-11-10 DIAGNOSIS — Z85.850 PERSONAL HISTORY OF MALIGNANT NEOPLASM OF THYROID: ICD-10-CM

## 2021-11-10 DIAGNOSIS — E89.0 POSTPROCEDURAL HYPOTHYROIDISM: ICD-10-CM

## 2021-11-10 DIAGNOSIS — K59.00 CONSTIPATION, UNSPECIFIED: ICD-10-CM

## 2021-11-10 DIAGNOSIS — Z78.9 OTHER SPECIFIED HEALTH STATUS: ICD-10-CM

## 2021-11-10 DIAGNOSIS — R19.7 DIARRHEA, UNSPECIFIED: ICD-10-CM

## 2021-11-10 LAB
CULTURE RESULTS: SIGNIFICANT CHANGE UP
SPECIMEN SOURCE: SIGNIFICANT CHANGE UP

## 2021-11-10 PROCEDURE — 99204 OFFICE O/P NEW MOD 45 MIN: CPT | Mod: 25

## 2021-11-10 PROCEDURE — 36415 COLL VENOUS BLD VENIPUNCTURE: CPT

## 2021-11-10 RX ORDER — BLOOD-GLUCOSE METER
W/DEVICE KIT MISCELLANEOUS
Qty: 1 | Refills: 0 | Status: DISCONTINUED | COMMUNITY
Start: 2020-12-07 | End: 2021-11-10

## 2021-11-10 RX ORDER — ALBUTEROL SULFATE 90 UG/1
108 (90 BASE) INHALANT RESPIRATORY (INHALATION)
Qty: 1 | Refills: 2 | Status: DISCONTINUED | COMMUNITY
Start: 2020-05-05 | End: 2021-11-10

## 2021-11-10 RX ORDER — URINE ACETONE TEST STRIPS
STRIP MISCELLANEOUS
Qty: 1 | Refills: 0 | Status: DISCONTINUED | COMMUNITY
Start: 2020-12-07 | End: 2021-11-10

## 2021-11-10 RX ORDER — BENZOCAINE .13; .15; .5; 2 G/100G; G/100G; G/100G; G/100G
32 GEL ORAL DAILY
Qty: 3 | Refills: 0 | Status: DISCONTINUED | COMMUNITY
Start: 2020-05-05 | End: 2021-11-10

## 2021-11-10 RX ORDER — LANCETS 33 GAUGE
EACH MISCELLANEOUS
Qty: 4 | Refills: 1 | Status: DISCONTINUED | COMMUNITY
Start: 2020-12-07 | End: 2021-11-10

## 2021-11-10 RX ORDER — BLOOD-GLUCOSE METER
KIT MISCELLANEOUS 4 TIMES DAILY
Qty: 4 | Refills: 1 | Status: DISCONTINUED | COMMUNITY
Start: 2020-12-07 | End: 2021-11-10

## 2021-11-10 NOTE — ED POST DISCHARGE NOTE - RESULT SUMMARY
UCx contaminated. Spoke with patient, feeling well, no urinary symptoms. Advised PCP f/u as needed. - RIKA GaleanoC

## 2021-11-11 ENCOUNTER — APPOINTMENT (OUTPATIENT)
Dept: GASTROENTEROLOGY | Facility: CLINIC | Age: 31
End: 2021-11-11

## 2021-11-11 NOTE — HISTORY OF PRESENT ILLNESS
[FreeTextEntry1] : Patient is a 31-year-old woman with a history of papillary thyroid cancer who reports longstanding GI issues for years.  She has a history of chronic constipation moving her bowels 1-2 times a week.  She has associated bloating and abdominal discomfort.  Generally the stool is hard at first but then softer.  Coffee helps her move her bowels.  The patient also notes a history of heartburn and previous burning epigastric pain.  She denies dysphagia.  Her weight has been stable.\par \par She was stable until Friday when she had a double espresso with oat milk.  She states 1-1/2 hours later, she developed nausea with gnawing epigastric pain followed by diarrhea and vomiting.  She vomited 3-4 times on Friday and had 3-4 episodes of diarrhea.  She denies fever.  She tried Pepcid and Tums without relief.  She vomited again on Saturday and had multiple episodes of diarrhea followed by chest pain.  She had more diarrhea and more chest pain on Sunday and went to the [facility] emergency room where evaluation was negative.  She denies melena or bright red blood per rectum.  She continues to have diarrhea over the last several days with 3 episodes in the morning.  She also had one episode of vomiting this morning.\par \par Of note, the patient was on Augmentin for 3 days last week for congestion.  Blood work in the emergency room did show a potassium of 3.4.  She has been taking Nexium 20 mg a day with help.\par \par The patient gave birth to a baby boy in February.  She is not breast-feeding.  Other than the pregnancy, the patient has not been admitted to the hospital in the past year and denies any cardiac issues.

## 2021-11-11 NOTE — REASON FOR VISIT
[Initial Evaluation] : an initial evaluation [FreeTextEntry1] : Diarrhea, nausea and vomiting, epigastric pain, chronic constipation

## 2021-11-11 NOTE — ASSESSMENT
[FreeTextEntry1] : Patient with longstanding GI issues including heartburn, epigastric pain, and chronic constipation.  She currently has acute symptoms of diarrhea with nausea and vomiting that seems most consistent with an acute gastroenteritis.  The diarrhea has persisted for several days.  Of note, the patient was on Augmentin last week and C. difficile needs to be considered.\par \par Stool studies will be sent for a GI infectious PCR panel and C. diff by PCR.\par \par The patient was advised to stay on a BRAT diet and to drink plenty of fluids including Gatorade.  The patient was advised to avoid antidiarrheals.\par \par Bloodwork was sent for CBC, chem-pack, TSH, celiac markers, amylase, lipase.\par \par Patient was advised to call if she has ongoing symptoms next week.\par \par To evaluate her more chronic symptoms, an EGD and colonoscopy have been scheduled. The risks, benefits, alternatives, and limitations of the procedures, including the possibility of missed lesions, were explained.

## 2021-11-11 NOTE — CONSULT LETTER
[FreeTextEntry1] : Dear Dr. Mickie Lawrence,\par \[facility] I had the pleasure of seeing your patient LEONILA PIZANO in the office today.  My office note is attached. PLEASE READ THE "ASSESSMENT" SECTION OF THE NOTE TO SEE MY IMPRESSION AND PLAN.\par \par Thank you very much for allowing me to participate in the care of your patient.\par \[facility] Sincerely,\[facility] \par Bakari Mejia M.D., FAC, FACP\[facility] Director, Celiac Program at [facility]  of Medicine\[facility] and [employer] at [facility] [place] Practice Director,\[facility] - Gastroenterology/Internal Medicine at [address]\[place] [address]\[facility] Tel: [phone]\[facility] Email: [email or web address] \[facility] \[facility] The attached note has been created using a voice recognition system (Dragon).  There may be some misspellings and typos.  Please call my office if you have any issues or questions.

## 2021-11-15 LAB
ALBUMIN SERPL ELPH-MCNC: 4.1 G/DL
ALP BLD-CCNC: 75 U/L
ALT SERPL-CCNC: 26 U/L
AMYLASE/CREAT SERPL: 63 U/L
ANION GAP SERPL CALC-SCNC: 13 MMOL/L
AST SERPL-CCNC: 33 U/L
BASOPHILS # BLD AUTO: 0.03 K/UL
BASOPHILS NFR BLD AUTO: 0.4 %
BILIRUB SERPL-MCNC: 0.4 MG/DL
BUN SERPL-MCNC: 9 MG/DL
CALCIUM SERPL-MCNC: 8.5 MG/DL
CHLORIDE SERPL-SCNC: 99 MMOL/L
CO2 SERPL-SCNC: 26 MMOL/L
CREAT SERPL-MCNC: 0.64 MG/DL
ENDOMYSIUM IGA SER QL: NEGATIVE
ENDOMYSIUM IGA TITR SER: NORMAL
EOSINOPHIL # BLD AUTO: 0.12 K/UL
EOSINOPHIL NFR BLD AUTO: 1.5 %
GGT SERPL-CCNC: 7 U/L
GI PCR PANEL, STOOL: ABNORMAL
GLIADIN IGA SER QL: <5 UNITS
GLIADIN IGG SER QL: <5 UNITS
GLIADIN PEPTIDE IGA SER-ACNC: NEGATIVE
GLIADIN PEPTIDE IGG SER-ACNC: NEGATIVE
GLUCOSE SERPL-MCNC: 83 MG/DL
HCT VFR BLD CALC: 43.7 %
HGB BLD-MCNC: 13.9 G/DL
IGA SER QL IEP: 287 MG/DL
IMM GRANULOCYTES NFR BLD AUTO: 0.5 %
LPL SERPL-CCNC: 27 U/L
LYMPHOCYTES # BLD AUTO: 2.29 K/UL
LYMPHOCYTES NFR BLD AUTO: 28.6 %
MAN DIFF?: NORMAL
MCHC RBC-ENTMCNC: 28.1 PG
MCHC RBC-ENTMCNC: 31.8 GM/DL
MCV RBC AUTO: 88.5 FL
MONOCYTES # BLD AUTO: 0.77 K/UL
MONOCYTES NFR BLD AUTO: 9.6 %
NEUTROPHILS # BLD AUTO: 4.77 K/UL
NEUTROPHILS NFR BLD AUTO: 59.4 %
PLATELET # BLD AUTO: 357 K/UL
POTASSIUM SERPL-SCNC: 3.7 MMOL/L
PROT SERPL-MCNC: 6.8 G/DL
RBC # BLD: 4.94 M/UL
RBC # FLD: 12.9 %
SODIUM SERPL-SCNC: 138 MMOL/L
TSH SERPL-ACNC: 1.87 UIU/ML
TTG IGA SER IA-ACNC: <1.2 U/ML
TTG IGA SER-ACNC: NEGATIVE
TTG IGG SER IA-ACNC: 1.5 U/ML
TTG IGG SER IA-ACNC: NEGATIVE
WBC # FLD AUTO: 8.02 K/UL

## 2021-11-29 ENCOUNTER — APPOINTMENT (OUTPATIENT)
Dept: CT IMAGING | Facility: CLINIC | Age: 31
End: 2021-11-29
Payer: COMMERCIAL

## 2021-11-29 ENCOUNTER — OUTPATIENT (OUTPATIENT)
Dept: OUTPATIENT SERVICES | Facility: HOSPITAL | Age: 31
LOS: 1 days | End: 2021-11-29
Payer: COMMERCIAL

## 2021-11-29 DIAGNOSIS — R07.9 CHEST PAIN, UNSPECIFIED: ICD-10-CM

## 2021-11-29 DIAGNOSIS — E89.0 POSTPROCEDURAL HYPOTHYROIDISM: Chronic | ICD-10-CM

## 2021-11-29 DIAGNOSIS — E78.5 HYPERLIPIDEMIA, UNSPECIFIED: ICD-10-CM

## 2021-11-29 DIAGNOSIS — Z82.49 FAMILY HISTORY OF ISCHEMIC HEART DISEASE AND OTHER DISEASES OF THE CIRCULATORY SYSTEM: ICD-10-CM

## 2021-11-29 DIAGNOSIS — Z00.8 ENCOUNTER FOR OTHER GENERAL EXAMINATION: ICD-10-CM

## 2021-11-29 PROCEDURE — 75574 CT ANGIO HRT W/3D IMAGE: CPT | Mod: 26

## 2021-11-29 PROCEDURE — 75574 CT ANGIO HRT W/3D IMAGE: CPT

## 2021-12-03 ENCOUNTER — APPOINTMENT (OUTPATIENT)
Dept: GASTROENTEROLOGY | Facility: CLINIC | Age: 31
End: 2021-12-03

## 2021-12-14 DIAGNOSIS — Z20.822 CONTACT WITH AND (SUSPECTED) EXPOSURE TO COVID-19: ICD-10-CM

## 2021-12-14 DIAGNOSIS — Z01.818 ENCOUNTER FOR OTHER PREPROCEDURAL EXAMINATION: ICD-10-CM

## 2022-01-07 ENCOUNTER — APPOINTMENT (OUTPATIENT)
Dept: GASTROENTEROLOGY | Facility: AMBULATORY MEDICAL SERVICES | Age: 32
End: 2022-01-07

## 2022-01-14 ENCOUNTER — NON-APPOINTMENT (OUTPATIENT)
Age: 32
End: 2022-01-14

## 2022-03-04 ENCOUNTER — APPOINTMENT (OUTPATIENT)
Dept: GASTROENTEROLOGY | Facility: AMBULATORY MEDICAL SERVICES | Age: 32
End: 2022-03-04

## 2022-03-25 ENCOUNTER — APPOINTMENT (OUTPATIENT)
Dept: ANTEPARTUM | Facility: CLINIC | Age: 32
End: 2022-03-25
Payer: COMMERCIAL

## 2022-03-25 ENCOUNTER — LABORATORY RESULT (OUTPATIENT)
Age: 32
End: 2022-03-25

## 2022-03-25 ENCOUNTER — ASOB RESULT (OUTPATIENT)
Age: 32
End: 2022-03-25

## 2022-03-25 PROCEDURE — 36416 COLLJ CAPILLARY BLOOD SPEC: CPT

## 2022-03-25 PROCEDURE — 76813 OB US NUCHAL MEAS 1 GEST: CPT | Mod: 59

## 2022-03-25 PROCEDURE — 76817 TRANSVAGINAL US OBSTETRIC: CPT

## 2022-03-25 PROCEDURE — 76801 OB US < 14 WKS SINGLE FETUS: CPT

## 2022-03-25 PROCEDURE — 99213 OFFICE O/P EST LOW 20 MIN: CPT | Mod: 25

## 2022-04-11 ENCOUNTER — APPOINTMENT (OUTPATIENT)
Dept: MATERNAL FETAL MEDICINE | Facility: CLINIC | Age: 32
End: 2022-04-11
Payer: COMMERCIAL

## 2022-04-11 ENCOUNTER — ASOB RESULT (OUTPATIENT)
Age: 32
End: 2022-04-11

## 2022-04-11 ENCOUNTER — APPOINTMENT (OUTPATIENT)
Dept: ANTEPARTUM | Facility: CLINIC | Age: 32
End: 2022-04-11
Payer: COMMERCIAL

## 2022-04-11 PROCEDURE — 99214 OFFICE O/P EST MOD 30 MIN: CPT | Mod: 25

## 2022-04-11 PROCEDURE — 76817 TRANSVAGINAL US OBSTETRIC: CPT

## 2022-04-11 PROCEDURE — 76815 OB US LIMITED FETUS(S): CPT

## 2022-04-12 ENCOUNTER — NON-APPOINTMENT (OUTPATIENT)
Age: 32
End: 2022-04-12

## 2022-04-25 ENCOUNTER — APPOINTMENT (OUTPATIENT)
Dept: ANTEPARTUM | Facility: CLINIC | Age: 32
End: 2022-04-25
Payer: COMMERCIAL

## 2022-04-25 ENCOUNTER — ASOB RESULT (OUTPATIENT)
Age: 32
End: 2022-04-25

## 2022-04-25 ENCOUNTER — APPOINTMENT (OUTPATIENT)
Dept: MATERNAL FETAL MEDICINE | Facility: CLINIC | Age: 32
End: 2022-04-25
Payer: COMMERCIAL

## 2022-04-25 PROCEDURE — 99213 OFFICE O/P EST LOW 20 MIN: CPT | Mod: 25

## 2022-04-25 PROCEDURE — 76817 TRANSVAGINAL US OBSTETRIC: CPT

## 2022-04-25 PROCEDURE — 76816 OB US FOLLOW-UP PER FETUS: CPT

## 2022-04-26 ENCOUNTER — APPOINTMENT (OUTPATIENT)
Dept: GYNECOLOGIC ONCOLOGY | Facility: CLINIC | Age: 32
End: 2022-04-26
Payer: COMMERCIAL

## 2022-04-26 VITALS
SYSTOLIC BLOOD PRESSURE: 97 MMHG | HEART RATE: 65 BPM | HEIGHT: 62 IN | DIASTOLIC BLOOD PRESSURE: 64 MMHG | BODY MASS INDEX: 24.29 KG/M2 | WEIGHT: 132 LBS

## 2022-04-26 DIAGNOSIS — D21.9 BENIGN NEOPLASM OF CONNECTIVE AND OTHER SOFT TISSUE, UNSPECIFIED: ICD-10-CM

## 2022-04-26 DIAGNOSIS — O09.90 SUPERVISION OF HIGH RISK PREGNANCY, UNSPECIFIED, UNSPECIFIED TRIMESTER: ICD-10-CM

## 2022-04-26 DIAGNOSIS — Z34.90 ENCOUNTER FOR SUPERVISION OF NORMAL PREGNANCY, UNSPECIFIED, UNSPECIFIED TRIMESTER: ICD-10-CM

## 2022-04-26 PROCEDURE — 99204 OFFICE O/P NEW MOD 45 MIN: CPT

## 2022-04-26 RX ORDER — LEVOTHYROXINE SODIUM 137 UG/1
TABLET ORAL
Refills: 0 | Status: ACTIVE | COMMUNITY

## 2022-04-26 RX ORDER — LEVOTHYROXINE SODIUM 112 UG/1
112 TABLET ORAL
Qty: 90 | Refills: 0 | Status: DISCONTINUED | COMMUNITY
Start: 2019-05-29 | End: 2022-04-26

## 2022-04-26 RX ORDER — SODIUM PICOSULFATE, MAGNESIUM OXIDE, AND ANHYDROUS CITRIC ACID 10; 3.5; 12 MG/160ML; G/160ML; G/160ML
10-3.5-12 MG-GM LIQUID ORAL
Qty: 1 | Refills: 0 | Status: DISCONTINUED | COMMUNITY
Start: 2021-11-10 | End: 2022-04-26

## 2022-04-26 RX ORDER — ONDANSETRON 4 MG/1
4 TABLET ORAL
Qty: 30 | Refills: 0 | Status: DISCONTINUED | COMMUNITY
Start: 2021-09-11 | End: 2022-04-26

## 2022-05-20 ENCOUNTER — APPOINTMENT (OUTPATIENT)
Dept: MATERNAL FETAL MEDICINE | Facility: CLINIC | Age: 32
End: 2022-05-20
Payer: COMMERCIAL

## 2022-05-20 ENCOUNTER — APPOINTMENT (OUTPATIENT)
Dept: ANTEPARTUM | Facility: CLINIC | Age: 32
End: 2022-05-20
Payer: COMMERCIAL

## 2022-05-20 ENCOUNTER — ASOB RESULT (OUTPATIENT)
Age: 32
End: 2022-05-20

## 2022-05-20 DIAGNOSIS — O35.9XX0 MATERNAL CARE FOR (SUSPECTED) FETAL ABNORMALITY AND DAMAGE, UNSPECIFIED, NOT APPLICABLE OR UNSPECIFIED: ICD-10-CM

## 2022-05-20 PROCEDURE — 76817 TRANSVAGINAL US OBSTETRIC: CPT

## 2022-05-20 PROCEDURE — 99213 OFFICE O/P EST LOW 20 MIN: CPT | Mod: 25

## 2022-05-20 PROCEDURE — 76811 OB US DETAILED SNGL FETUS: CPT

## 2022-05-27 ENCOUNTER — OUTPATIENT (OUTPATIENT)
Dept: OUTPATIENT SERVICES | Age: 32
LOS: 1 days | Discharge: HOME | End: 2022-05-27

## 2022-05-27 DIAGNOSIS — E89.0 POSTPROCEDURAL HYPOTHYROIDISM: Chronic | ICD-10-CM

## 2022-06-03 ENCOUNTER — APPOINTMENT (OUTPATIENT)
Dept: PEDIATRIC CARDIOLOGY | Facility: CLINIC | Age: 32
End: 2022-06-03
Payer: COMMERCIAL

## 2022-06-03 PROCEDURE — 76827 ECHO EXAM OF FETAL HEART: CPT

## 2022-06-03 PROCEDURE — 76820 UMBILICAL ARTERY ECHO: CPT

## 2022-06-03 PROCEDURE — 93325 DOPPLER ECHO COLOR FLOW MAPG: CPT | Mod: 59

## 2022-06-03 PROCEDURE — 76825 ECHO EXAM OF FETAL HEART: CPT

## 2022-06-03 PROCEDURE — 99202 OFFICE O/P NEW SF 15 MIN: CPT | Mod: 25

## 2022-06-03 PROCEDURE — 76821 MIDDLE CEREBRAL ARTERY ECHO: CPT

## 2022-06-09 ENCOUNTER — APPOINTMENT (OUTPATIENT)
Dept: MATERNAL FETAL MEDICINE | Facility: CLINIC | Age: 32
End: 2022-06-09

## 2022-06-09 ENCOUNTER — APPOINTMENT (OUTPATIENT)
Dept: ANTEPARTUM | Facility: CLINIC | Age: 32
End: 2022-06-09
Payer: COMMERCIAL

## 2022-06-09 ENCOUNTER — ASOB RESULT (OUTPATIENT)
Age: 32
End: 2022-06-09

## 2022-06-09 PROCEDURE — 76816 OB US FOLLOW-UP PER FETUS: CPT

## 2022-06-09 PROCEDURE — 76817 TRANSVAGINAL US OBSTETRIC: CPT

## 2022-07-08 ENCOUNTER — ASOB RESULT (OUTPATIENT)
Age: 32
End: 2022-07-08

## 2022-07-08 ENCOUNTER — APPOINTMENT (OUTPATIENT)
Dept: ANTEPARTUM | Facility: CLINIC | Age: 32
End: 2022-07-08

## 2022-07-08 PROCEDURE — 76816 OB US FOLLOW-UP PER FETUS: CPT

## 2022-08-15 ENCOUNTER — APPOINTMENT (OUTPATIENT)
Dept: ANTEPARTUM | Facility: CLINIC | Age: 32
End: 2022-08-15

## 2022-08-15 ENCOUNTER — ASOB RESULT (OUTPATIENT)
Age: 32
End: 2022-08-15

## 2022-08-15 PROCEDURE — 76816 OB US FOLLOW-UP PER FETUS: CPT

## 2022-09-01 ENCOUNTER — OUTPATIENT (OUTPATIENT)
Dept: OUTPATIENT SERVICES | Facility: HOSPITAL | Age: 32
LOS: 1 days | End: 2022-09-01
Payer: COMMERCIAL

## 2022-09-01 VITALS
TEMPERATURE: 98 F | HEART RATE: 66 BPM | OXYGEN SATURATION: 98 % | SYSTOLIC BLOOD PRESSURE: 128 MMHG | DIASTOLIC BLOOD PRESSURE: 86 MMHG | RESPIRATION RATE: 17 BRPM

## 2022-09-01 VITALS — DIASTOLIC BLOOD PRESSURE: 76 MMHG | SYSTOLIC BLOOD PRESSURE: 122 MMHG | HEART RATE: 59 BPM

## 2022-09-01 DIAGNOSIS — O26.899 OTHER SPECIFIED PREGNANCY RELATED CONDITIONS, UNSPECIFIED TRIMESTER: ICD-10-CM

## 2022-09-01 DIAGNOSIS — Z3A.00 WEEKS OF GESTATION OF PREGNANCY NOT SPECIFIED: ICD-10-CM

## 2022-09-01 DIAGNOSIS — E89.0 POSTPROCEDURAL HYPOTHYROIDISM: Chronic | ICD-10-CM

## 2022-09-01 LAB
APPEARANCE UR: ABNORMAL
BACTERIA # UR AUTO: ABNORMAL
BASOPHILS # BLD AUTO: 0.04 K/UL — SIGNIFICANT CHANGE UP (ref 0–0.2)
BASOPHILS NFR BLD AUTO: 0.3 % — SIGNIFICANT CHANGE UP (ref 0–2)
BILIRUB UR-MCNC: NEGATIVE — SIGNIFICANT CHANGE UP
COD CRY URNS QL: ABNORMAL
COLOR SPEC: YELLOW — SIGNIFICANT CHANGE UP
DIFF PNL FLD: NEGATIVE — SIGNIFICANT CHANGE UP
EOSINOPHIL # BLD AUTO: 0.14 K/UL — SIGNIFICANT CHANGE UP (ref 0–0.5)
EOSINOPHIL NFR BLD AUTO: 1.1 % — SIGNIFICANT CHANGE UP (ref 0–6)
EPI CELLS # UR: 19 /HPF — HIGH
GLUCOSE UR QL: NEGATIVE — SIGNIFICANT CHANGE UP
HCT VFR BLD CALC: 40.4 % — SIGNIFICANT CHANGE UP (ref 34.5–45)
HGB BLD-MCNC: 13.4 G/DL — SIGNIFICANT CHANGE UP (ref 11.5–15.5)
HYALINE CASTS # UR AUTO: 4 /LPF — HIGH (ref 0–2)
IMM GRANULOCYTES NFR BLD AUTO: 0.5 % — SIGNIFICANT CHANGE UP (ref 0–1.5)
KETONES UR-MCNC: NEGATIVE — SIGNIFICANT CHANGE UP
LEUKOCYTE ESTERASE UR-ACNC: ABNORMAL
LYMPHOCYTES # BLD AUTO: 2.87 K/UL — SIGNIFICANT CHANGE UP (ref 1–3.3)
LYMPHOCYTES # BLD AUTO: 23.1 % — SIGNIFICANT CHANGE UP (ref 13–44)
MCHC RBC-ENTMCNC: 28.2 PG — SIGNIFICANT CHANGE UP (ref 27–34)
MCHC RBC-ENTMCNC: 33.2 GM/DL — SIGNIFICANT CHANGE UP (ref 32–36)
MCV RBC AUTO: 85.1 FL — SIGNIFICANT CHANGE UP (ref 80–100)
MONOCYTES # BLD AUTO: 0.8 K/UL — SIGNIFICANT CHANGE UP (ref 0–0.9)
MONOCYTES NFR BLD AUTO: 6.4 % — SIGNIFICANT CHANGE UP (ref 2–14)
NEUTROPHILS # BLD AUTO: 8.51 K/UL — HIGH (ref 1.8–7.4)
NEUTROPHILS NFR BLD AUTO: 68.6 % — SIGNIFICANT CHANGE UP (ref 43–77)
NITRITE UR-MCNC: NEGATIVE — SIGNIFICANT CHANGE UP
NRBC # BLD: 0 /100 WBCS — SIGNIFICANT CHANGE UP (ref 0–0)
PH UR: 6.5 — SIGNIFICANT CHANGE UP (ref 5–8)
PLATELET # BLD AUTO: 292 K/UL — SIGNIFICANT CHANGE UP (ref 150–400)
PROT UR-MCNC: ABNORMAL
RBC # BLD: 4.75 M/UL — SIGNIFICANT CHANGE UP (ref 3.8–5.2)
RBC # FLD: 13.6 % — SIGNIFICANT CHANGE UP (ref 10.3–14.5)
RBC CASTS # UR COMP ASSIST: 2 /HPF — SIGNIFICANT CHANGE UP (ref 0–4)
SP GR SPEC: 1.02 — SIGNIFICANT CHANGE UP (ref 1.01–1.02)
UROBILINOGEN FLD QL: NEGATIVE — SIGNIFICANT CHANGE UP
WBC # BLD: 12.42 K/UL — HIGH (ref 3.8–10.5)
WBC # FLD AUTO: 12.42 K/UL — HIGH (ref 3.8–10.5)
WBC UR QL: 4 /HPF — SIGNIFICANT CHANGE UP (ref 0–5)

## 2022-09-01 PROCEDURE — G0463: CPT

## 2022-09-01 PROCEDURE — 85025 COMPLETE CBC W/AUTO DIFF WBC: CPT

## 2022-09-01 PROCEDURE — 81001 URINALYSIS AUTO W/SCOPE: CPT

## 2022-09-01 PROCEDURE — 86780 TREPONEMA PALLIDUM: CPT

## 2022-09-01 PROCEDURE — 59025 FETAL NON-STRESS TEST: CPT

## 2022-09-01 PROCEDURE — 36415 COLL VENOUS BLD VENIPUNCTURE: CPT

## 2022-09-01 RX ORDER — SODIUM CHLORIDE 9 MG/ML
1000 INJECTION, SOLUTION INTRAVENOUS ONCE
Refills: 0 | Status: COMPLETED | OUTPATIENT
Start: 2022-09-01 | End: 2022-09-01

## 2022-09-01 RX ADMIN — Medication 12 MILLIGRAM(S): at 22:16

## 2022-09-01 RX ADMIN — SODIUM CHLORIDE 1000 MILLILITER(S): 9 INJECTION, SOLUTION INTRAVENOUS at 21:55

## 2022-09-01 NOTE — OB RN TRIAGE NOTE - NS_OBGYNHISTORY_OBGYN_ALL_OB_FT
x1   3cm cervical fibroid  thyroidectomy  x1   3cm cervical fibroid  thyroidectomy - synthroid 137mcg

## 2022-09-01 NOTE — OB PROVIDER TRIAGE NOTE - NSOBPROVIDERNOTE_OBGYN_ALL_OB_FT
A/P: Pt is a 32y G_P_ who presents [active labor/SROM/PROM/ for induction of labor].  - prenatal issues, GBS status    1. Admit to LND. Routine Labs. IVF  2. Expectant Management vs. IOL  3. Fetus: Vertex, Reactive/CEFM  4.   5. GBS pos, for Amp / GBS neg  6. Pain: IV pain meds/epidural PRN  7. PNC   - HbsAg and HIV negative     Tk Ramirez, PGY-1  Obstetrics and Gynecology    Discussed with A/P: Pt is a 32y  @ 35.6wga who presents for BMZ and r/o PTL. Patient with similar exam compared to exam from the office. However, patient noted to be josé here on the toco and has been feeling them.   - Will administer IVF  - BMZ to be administered  - Will recheck in 2-3hrs     Tk Ramirez, PGY-1  Obstetrics and Gynecology    Discussed with Dr. Wayne A/P: Pt is a 32y  @ 35.6wga who presents for BMZ and r/o PTL. Patient with similar exam compared to exam from the office. However, patient noted to be josé here on the toco and has been feeling them.   - Will administer IVF  - BMZ to be administered  - Will recheck in 2-3hrs     2338: Repeat SVE 3.5/60/-3. Unchanged from prior. Tracing reactive here. 135/mod/(+)accels/(-)decels ctx q 4min   - Reviewed return precautions with patient  - Patient instructed to return tomorrow PM for second dose of BMZ    Tk Ramirez, PGY-1  Obstetrics and Gynecology    Discussed with Dr. Wayne

## 2022-09-01 NOTE — OB RN TRIAGE NOTE - FALL HARM RISK - UNIVERSAL INTERVENTIONS
Bed in lowest position, wheels locked, appropriate side rails in place/Call bell, personal items and telephone in reach/Instruct patient to call for assistance before getting out of bed or chair/Non-slip footwear when patient is out of bed/[place] to call system/Physically safe environment - no spills, clutter or unnecessary equipment/Purposeful Proactive Rounding/Room/bathroom lighting operational, light cord in reach

## 2022-09-01 NOTE — OB PROVIDER TRIAGE NOTE - HISTORY OF PRESENT ILLNESS
HPI: Pt is a 32y  @ 35.6wga  presenting for contractions for approximately x1 week, worsening starting Monday. ***  FM (+)  LOF (-)  CTX (-)  VB (-)  GBS pos/neg  EFW:    PNC complicated by     OBHx:  GynHx: Denies any gynecologic issues  PMHx: Denies  PSHx: Denies  Med: PNV  All: NKDA  Psych: Denies hx of mental health issues  SH: Denies hx of smoking, drinking, or drug usage during the pregnancy    Vital Signs Last 24 Hrs  T(C): 36.7 (01 Sep 2022 20:56), Max: 36.7 (01 Sep 2022 20:49)  T(F): 98.06 (01 Sep 2022 20:56), Max: 98.1 (01 Sep 2022 20:49)  HR: 62 (01 Sep 2022 22:00) (62 - 79)  BP: 128/86 (01 Sep 2022 20:56) (128/86 - 128/86)  BP(mean): --  RR: 16 (01 Sep 2022 20:56) (16 - 17)  SpO2: 98% (01 Sep 2022 22:00) (94% - 100%)    Parameters below as of 01 Sep 2022 20:49  Patient On (Oxygen Delivery Method): room air        SVE:  FHT:  [place]:  Sono: Vertex           HPI: Pt is a 32y  @ 35.6wga  presenting for contractions for approximately x1 week, worsening starting Monday. States she has been josé irregularly, but occasionally with regularity. Says she was evaluated by her Ob/Gyn and was 3cm. Patient says a GBS was swab was obtained today  FM (+)  LOF (-)  CTX, above  VB (-)  GBS unk  EFW: 2410g (yesterday)    PNC complicated by cervical fibroid. States that she has been cleared by M for a vaginal delivery     OBHx:  - . VD. c/b PROM @ 37.2wga. Otherwise uncomplicated  GynHx: Cervical fibroid as above   PMHx: Hypothyroidism 2/2 to thyroidectomy for thyroid cancer ()  PSHx: Tonsillectomy and thyroidectomy  Med: PNV, Levothyroxine 137mcg qd  All: NKDA, intolerance to Azithro  Psych: Denies hx of mental health issues  SH: Denies hx of smoking, drinking, or drug usage during the pregnancy    Vital Signs Last 24 Hrs  T(C): 36.7 (01 Sep 2022 20:56), Max: 36.7 (01 Sep 2022 20:49)  T(F): 98.06 (01 Sep 2022 20:56), Max: 98.1 (01 Sep 2022 20:49)  HR: 62 (01 Sep 2022 22:00) (62 - 79)  BP: 128/86 (01 Sep 2022 20:56) (128/86 - 128/86)  BP(mean): --  RR: 16 (01 Sep 2022 20:56) (16 - 17)  SpO2: 98% (01 Sep 2022 22:00) (94% - 100%)    Parameters below as of 01 Sep 2022 20:49  Patient On (Oxygen Delivery Method): room air        SVE: 3.5/60/-3 ()  FHT: 130/mod/(+)accels/(-)decels  [place]: q4min  Sono: Vertex

## 2022-09-02 ENCOUNTER — NON-APPOINTMENT (OUTPATIENT)
Age: 32
End: 2022-09-02

## 2022-09-02 ENCOUNTER — TRANSCRIPTION ENCOUNTER (OUTPATIENT)
Age: 32
End: 2022-09-02

## 2022-09-02 ENCOUNTER — INPATIENT (INPATIENT)
Facility: HOSPITAL | Age: 32
LOS: 1 days | Discharge: HOME | End: 2022-09-04
Attending: OBSTETRICS & GYNECOLOGY | Admitting: OBSTETRICS & GYNECOLOGY
Payer: COMMERCIAL

## 2022-09-02 VITALS — SYSTOLIC BLOOD PRESSURE: 122 MMHG | DIASTOLIC BLOOD PRESSURE: 82 MMHG | TEMPERATURE: 98 F | HEART RATE: 68 BPM

## 2022-09-02 DIAGNOSIS — Z3A.00 WEEKS OF GESTATION OF PREGNANCY NOT SPECIFIED: ICD-10-CM

## 2022-09-02 DIAGNOSIS — Z34.80 ENCOUNTER FOR SUPERVISION OF OTHER NORMAL PREGNANCY, UNSPECIFIED TRIMESTER: ICD-10-CM

## 2022-09-02 DIAGNOSIS — O26.899 OTHER SPECIFIED PREGNANCY RELATED CONDITIONS, UNSPECIFIED TRIMESTER: ICD-10-CM

## 2022-09-02 DIAGNOSIS — E89.0 POSTPROCEDURAL HYPOTHYROIDISM: Chronic | ICD-10-CM

## 2022-09-02 LAB
BLD GP AB SCN SERPL QL: POSITIVE — SIGNIFICANT CHANGE UP
COVID-19 SPIKE DOMAIN AB INTERP: POSITIVE
COVID-19 SPIKE DOMAIN ANTIBODY RESULT: >250 U/ML — HIGH
RH IG SCN BLD-IMP: NEGATIVE — SIGNIFICANT CHANGE UP
SARS-COV-2 IGG+IGM SERPL QL IA: >250 U/ML — HIGH
SARS-COV-2 IGG+IGM SERPL QL IA: POSITIVE
T PALLIDUM AB TITR SER: NEGATIVE — SIGNIFICANT CHANGE UP

## 2022-09-02 PROCEDURE — 86077 PHYS BLOOD BANK SERV XMATCH: CPT

## 2022-09-02 PROCEDURE — 88307 TISSUE EXAM BY PATHOLOGIST: CPT | Mod: 26

## 2022-09-02 RX ORDER — OXYCODONE HYDROCHLORIDE 5 MG/1
5 TABLET ORAL ONCE
Refills: 0 | Status: DISCONTINUED | OUTPATIENT
Start: 2022-09-02 | End: 2022-09-04

## 2022-09-02 RX ORDER — PRAMOXINE HYDROCHLORIDE 150 MG/15G
1 AEROSOL, FOAM RECTAL EVERY 4 HOURS
Refills: 0 | Status: DISCONTINUED | OUTPATIENT
Start: 2022-09-02 | End: 2022-09-04

## 2022-09-02 RX ORDER — TETANUS TOXOID, REDUCED DIPHTHERIA TOXOID AND ACELLULAR PERTUSSIS VACCINE, ADSORBED 5; 2.5; 8; 8; 2.5 [IU]/.5ML; [IU]/.5ML; UG/.5ML; UG/.5ML; UG/.5ML
0.5 SUSPENSION INTRAMUSCULAR ONCE
Refills: 0 | Status: DISCONTINUED | OUTPATIENT
Start: 2022-09-02 | End: 2022-09-04

## 2022-09-02 RX ORDER — IBUPROFEN 200 MG
600 TABLET ORAL EVERY 6 HOURS
Refills: 0 | Status: COMPLETED | OUTPATIENT
Start: 2022-09-02 | End: 2023-08-01

## 2022-09-02 RX ORDER — SIMETHICONE 80 MG/1
80 TABLET, CHEWABLE ORAL EVERY 4 HOURS
Refills: 0 | Status: DISCONTINUED | OUTPATIENT
Start: 2022-09-02 | End: 2022-09-04

## 2022-09-02 RX ORDER — CHLORHEXIDINE GLUCONATE 213 G/1000ML
1 SOLUTION TOPICAL ONCE
Refills: 0 | Status: DISCONTINUED | OUTPATIENT
Start: 2022-09-02 | End: 2022-09-02

## 2022-09-02 RX ORDER — AER TRAVELER 0.5 G/1
1 SOLUTION RECTAL; TOPICAL EVERY 4 HOURS
Refills: 0 | Status: DISCONTINUED | OUTPATIENT
Start: 2022-09-02 | End: 2022-09-04

## 2022-09-02 RX ORDER — OXYCODONE HYDROCHLORIDE 5 MG/1
5 TABLET ORAL
Refills: 0 | Status: DISCONTINUED | OUTPATIENT
Start: 2022-09-02 | End: 2022-09-04

## 2022-09-02 RX ORDER — KETOROLAC TROMETHAMINE 30 MG/ML
30 SYRINGE (ML) INJECTION ONCE
Refills: 0 | Status: DISCONTINUED | OUTPATIENT
Start: 2022-09-02 | End: 2022-09-02

## 2022-09-02 RX ORDER — BENZOCAINE 10 %
1 GEL (GRAM) MUCOUS MEMBRANE EVERY 6 HOURS
Refills: 0 | Status: DISCONTINUED | OUTPATIENT
Start: 2022-09-02 | End: 2022-09-04

## 2022-09-02 RX ORDER — MAGNESIUM HYDROXIDE 400 MG/1
30 TABLET, CHEWABLE ORAL
Refills: 0 | Status: DISCONTINUED | OUTPATIENT
Start: 2022-09-02 | End: 2022-09-04

## 2022-09-02 RX ORDER — OXYTOCIN 10 UNIT/ML
333.33 VIAL (ML) INJECTION
Qty: 20 | Refills: 0 | Status: DISCONTINUED | OUTPATIENT
Start: 2022-09-02 | End: 2022-09-02

## 2022-09-02 RX ORDER — SODIUM CHLORIDE 9 MG/ML
3 INJECTION INTRAMUSCULAR; INTRAVENOUS; SUBCUTANEOUS EVERY 8 HOURS
Refills: 0 | Status: DISCONTINUED | OUTPATIENT
Start: 2022-09-02 | End: 2022-09-04

## 2022-09-02 RX ORDER — DIPHENHYDRAMINE HCL 50 MG
25 CAPSULE ORAL EVERY 6 HOURS
Refills: 0 | Status: DISCONTINUED | OUTPATIENT
Start: 2022-09-02 | End: 2022-09-04

## 2022-09-02 RX ORDER — ACETAMINOPHEN 500 MG
975 TABLET ORAL
Refills: 0 | Status: DISCONTINUED | OUTPATIENT
Start: 2022-09-02 | End: 2022-09-04

## 2022-09-02 RX ORDER — LANOLIN
1 OINTMENT (GRAM) TOPICAL EVERY 6 HOURS
Refills: 0 | Status: DISCONTINUED | OUTPATIENT
Start: 2022-09-02 | End: 2022-09-04

## 2022-09-02 RX ORDER — CITRIC ACID/SODIUM CITRATE 300-500 MG
15 SOLUTION, ORAL ORAL EVERY 6 HOURS
Refills: 0 | Status: DISCONTINUED | OUTPATIENT
Start: 2022-09-02 | End: 2022-09-02

## 2022-09-02 RX ORDER — SODIUM CHLORIDE 9 MG/ML
1000 INJECTION, SOLUTION INTRAVENOUS
Refills: 0 | Status: DISCONTINUED | OUTPATIENT
Start: 2022-09-02 | End: 2022-09-02

## 2022-09-02 RX ORDER — OXYTOCIN 10 UNIT/ML
2 VIAL (ML) INJECTION
Qty: 30 | Refills: 0 | Status: DISCONTINUED | OUTPATIENT
Start: 2022-09-02 | End: 2022-09-04

## 2022-09-02 RX ORDER — HYDROCORTISONE 1 %
1 OINTMENT (GRAM) TOPICAL EVERY 6 HOURS
Refills: 0 | Status: DISCONTINUED | OUTPATIENT
Start: 2022-09-02 | End: 2022-09-04

## 2022-09-02 RX ORDER — IBUPROFEN 200 MG
600 TABLET ORAL EVERY 6 HOURS
Refills: 0 | Status: DISCONTINUED | OUTPATIENT
Start: 2022-09-02 | End: 2022-09-04

## 2022-09-02 RX ORDER — AMPICILLIN TRIHYDRATE 250 MG
2 CAPSULE ORAL ONCE
Refills: 0 | Status: COMPLETED | OUTPATIENT
Start: 2022-09-02 | End: 2022-09-02

## 2022-09-02 RX ORDER — AMPICILLIN TRIHYDRATE 250 MG
1 CAPSULE ORAL EVERY 4 HOURS
Refills: 0 | Status: DISCONTINUED | OUTPATIENT
Start: 2022-09-02 | End: 2022-09-02

## 2022-09-02 RX ORDER — DIBUCAINE 1 %
1 OINTMENT (GRAM) RECTAL EVERY 6 HOURS
Refills: 0 | Status: DISCONTINUED | OUTPATIENT
Start: 2022-09-02 | End: 2022-09-04

## 2022-09-02 RX ORDER — OXYTOCIN 10 UNIT/ML
333.33 VIAL (ML) INJECTION
Qty: 20 | Refills: 0 | Status: COMPLETED | OUTPATIENT
Start: 2022-09-02 | End: 2022-09-02

## 2022-09-02 RX ADMIN — Medication 2 MILLIUNIT(S)/MIN: at 05:53

## 2022-09-02 RX ADMIN — Medication 600 MILLIGRAM(S): at 15:06

## 2022-09-02 RX ADMIN — Medication 975 MILLIGRAM(S): at 11:34

## 2022-09-02 RX ADMIN — Medication 975 MILLIGRAM(S): at 18:39

## 2022-09-02 RX ADMIN — Medication 600 MILLIGRAM(S): at 20:35

## 2022-09-02 RX ADMIN — Medication 200 GRAM(S): at 04:06

## 2022-09-02 RX ADMIN — SODIUM CHLORIDE 125 MILLILITER(S): 9 INJECTION, SOLUTION INTRAVENOUS at 05:05

## 2022-09-02 RX ADMIN — Medication 600 MILLIGRAM(S): at 21:10

## 2022-09-02 RX ADMIN — Medication 1000 MILLIUNIT(S)/MIN: at 09:03

## 2022-09-02 RX ADMIN — Medication 30 MILLIGRAM(S): at 09:20

## 2022-09-02 RX ADMIN — Medication 600 MILLIGRAM(S): at 14:06

## 2022-09-02 RX ADMIN — Medication 1 TABLET(S): at 11:35

## 2022-09-02 RX ADMIN — Medication 975 MILLIGRAM(S): at 18:12

## 2022-09-02 NOTE — OB PROVIDER LABOR PROGRESS NOTE - ASSESSMENT
she recived epidural , exam post epidural she is 6 cm no bulging membrane she is possibly ruptured earlier at home at 2 am.  will augment labor.  amnio infusion in case needed.  she received one dose of betamethasone.  cervical fibroid is not felt at present time.  patient received antibiotic for unknown group B
Pt tolerated exams.     - Lateral positioning, attempts at hands and knees w/out adequate resuscitation.  - Terb x1 w/ resuscitation. Patient on maternal left.  - Peanut ball once adequately resuscitated.    Amyeo Afroz Jereen, PGY-2  Seen with Dr Ramon hernandez w/ Dr Steven

## 2022-09-02 NOTE — DISCHARGE NOTE OB - ADDITIONAL INSTRUCTIONS
call if heavy vaginal bleeding, shortness of breath, chest pain. fever > 100.5, follow up in 6 weeks

## 2022-09-02 NOTE — OB RN PATIENT PROFILE - FALL HARM RISK - UNIVERSAL INTERVENTIONS
Bed in lowest position, wheels locked, appropriate side rails in place/Call bell, personal items and telephone in reach/Instruct patient to call for assistance before getting out of bed or chair/Non-slip footwear when patient is out of bed/[place] to call system/Physically safe environment - no spills, clutter or unnecessary equipment/Purposeful Proactive Rounding/Room/bathroom lighting operational, light cord in reach

## 2022-09-02 NOTE — OB PROVIDER LABOR PROGRESS NOTE - NS_SUBJECTIVE/OBJECTIVE_OBGYN_ALL_OB_FT
FHT notable for tachysystole accompanied by prolonged decelerations to 60s over 1m w/ each contractions.
progress of labor

## 2022-09-02 NOTE — PRE-ANESTHESIA EVALUATION ADULT - NSANTHSNORERD_ENT_A_CORE
Pt presents for evaluation of a skin tear he sustained to his right forearm, when a picture frame fell onto his arm approximately 1 hr ago.  Pt has a skin tear to right leg, as well. He states was poked by lawn clippings while loading his trailer 2 days ago.    COVID-19 Screening:    • Does the patient OR patient’s household members have any of the following symptoms?  o Temperature: Fever ?100.0°F or ?37.8°C?  No  o Respiratory symptoms: New or worsening cough, shortness of breath, or sore throat? No  o GI symptoms: New onset of nausea, vomiting or diarrhea?  No  o Miscellaneous: New onset of loss of taste or smell, chills, repeated shaking with chills, muscle pain or headache?  No  • Has the patient or a household member tested positive for COVID-19 in the last 14 days?  No  • Has the patient or a household member been tested for COVID-19 and are waiting for the results?  No    Visit Vitals  /81   Pulse 70   Temp 97.8 °F (36.6 °C) (Temporal)   Resp 16   SpO2 96%          No

## 2022-09-02 NOTE — DISCHARGE NOTE OB - NS MD DC FALL RISK RISK
For information on Fall & Injury Prevention, visit: [email or web address] OR  [email or web address] OR  https://www.cdc.gov/steadi/patient.html

## 2022-09-02 NOTE — OB RN PATIENT PROFILE - LIVING CHILDREN, OB PROFILE
Impression: Dry eye syndrome of bilateral lacrimal glands: H04.123. Plan: Dry eyes account for the patient's complaints. There is no evidence of permanent changes to the cornea. Explained condition does not have a cure and will need artificial tears for maintenance.
1

## 2022-09-02 NOTE — DISCHARGE NOTE OB - CARE PLAN
Principal Discharge DX:	Premature infant of 36 weeks gestation  Assessment and plan of treatment:	vaginal delivery and post partum care  Secondary Diagnosis:	NVD (normal vaginal delivery)  Assessment and plan of treatment:	post partum care   1

## 2022-09-02 NOTE — OB RN DELIVERY SUMMARY - NS_ADMITLABOR_OBGYN_ALL_OB
[FreeTextEntry1] : 52 y/o M with Pmhx hypothyroidism, GERD, CVA, with chronic pancreatitis with chronic pain who presents today for follow up.  Pain is intermittent but occurring more frequently over the last few months. Episodes of pain come on and typically last 3 days each time but this comes and goes frequently 10/10 when severe and gradually decreased to 7/10.  Pain interferes with QOL and functioning, decreased appetite. Triggered by OJ, coffee, Ice cream . No new medical issues, denies AE. denies constipation or diarrhea.  \par \par Current meds:  Gabapentin 300mg TID,  Tramadol 50 mg BID prescribed by Dr. Theo Mason  Yes

## 2022-09-02 NOTE — DISCHARGE NOTE OB - CARE PROVIDER_API CALL
Jason Steven J  OBSTETRICS AND GYNECOLOGY  [address], [address]  [place] [address]  Phone: [phone]  Fax: [phone]  Established Patient  Follow Up Time: Routine

## 2022-09-02 NOTE — DISCHARGE NOTE OB - HOSPITAL COURSE
patient came in with contractions 2 minutes apart after epidural exam noticed the membrane where previously ruptured, sonogram was done one pocket of 5 cm was seen.  she progressed to fully had occasional variable deceleration which recovered.  her cervical fibroid was stretched as she dilated further.  she pushed on her side till delivery time and head was manually turned from transverse to OA.  controlled delivery of head done, tight cord was noticed around the neck was cut, baby handed to peds in attendance.  cord blood obtained.  spontaneous placenta, sent to pathology.  first degree vaginal tear was closed

## 2022-09-02 NOTE — DISCHARGE NOTE OB - MEDICATION SUMMARY - MEDICATIONS TO TAKE
I will START or STAY ON the medications listed below when I get home from the hospital:    acetaminophen 325 mg oral tablet  -- 3 tab(s) by mouth 4 times a day  -- Indication: For for moderate pain    ibuprofen 600 mg oral tablet  -- 1 tab(s) by mouth every 6 hours  -- Indication: For for sever pain    lanolin topical ointment  -- 1 application on skin every 6 hours, As needed, nipple soreness  -- Indication: For for breast feeding    Prenatal Multivitamins with Folic Acid 1 mg oral tablet  -- 1 tab(s) by mouth once a day  -- Indication: For for breast feeding    levothyroxine 137 mcg (0.137 mg) oral tablet  -- 1 tab(s) by mouth once a day  -- Indication: For for hypothyroid

## 2022-09-02 NOTE — OB PROVIDER H&P - ASSESSMENT
A/P: Pt is a 32y  @ 36.0wga who presents in PTL     1. Admit to LND. Routine Labs. IVF  2. Expectant Management   3. Fetus: Vertex, Cat 1  4. s/p BMZ x1 (~1020p )  5. For Amp given prematurity   6. For Epi     Tk Ramirez, PGY-1  Obstetrics and Gynecology    Discussed with Dr. Steven

## 2022-09-02 NOTE — PRE-ANESTHESIA EVALUATION ADULT - NSANTHALCOHOLSD_GEN_ALL_CORE
Anesthesia Start and Stop Event Times     Date Time Event    6/1/2021 1254 Ready for Procedure     1312 Anesthesia Start     1440 Anesthesia Stop        Responsible Staff  06/01/21    Name Role Begin End    Hilario Holt M.D. Anesth 1312 1440        Preop Diagnosis (Free Text):  Pre-op Diagnosis     PRIMARY LOCALIEZED OSTEOARTHRITIS OF RIGHT HIP, HIP PAIN RIGHT        Preop Diagnosis (Codes):    Post op Diagnosis  Osteoarthritis of right hip, unspecified osteoarthritis type      Premium Reason  Non-Premium    Comments:                                                                       
No

## 2022-09-02 NOTE — OB NEONATOLOGY/PEDIATRICIAN DELIVERY SUMMARY - NSPEDSNEONOTESA_OBGYN_ALL_OB_FT
Requested by OB to attend a , 36 wker with NRFHR. Mother is a 35 yo,  female with negative prenatal labs, GBS positive, treated with ampicillin x 1. Blood type O+. Hx of covid in july. ROM x  5hrs 24 minutes ptd. Infant  had tight cord around neck x 1. Infant pale pink, crying and vigorous. Apgars 8 and 8.  EOS 0.18 Admit to  nursery. Mother wants to breastfeed. Requested by OB to attend a , 36 wker with NRFHR. Mother is a 35 yo,  female with negative prenatal labs, GBS positive, treated with ampicillin x 1. Blood type O+. Hx of covid in july. Hx of thyroid cancer in /thyroidectomy. On synthroid. Received Beta x 1ROM x  5hrs 24 minutes ptd. Infant  had tight cord around neck x 1. Infant pale pink, crying and vigorous. Apgars 8 and 8.  EOS 0.18 Admit to  nursery. Mother wants to breastfeed.

## 2022-09-02 NOTE — OB PROVIDER H&P - HISTORY OF PRESENT ILLNESS
HPI: Pt is a 32y  @ 36.0wga presenting after awakening to worsening ctx starting at approximately 130a. q3min per pt. Worse in terms of intensity compared to prior (Was evaluated prior to PTL and received BMZ x1. Sent home after not making any cervical change)   FM (+)  LOF (-)  CTX, above  VB (-)  GBS unk  EFW: 2410g (2022 per pt)    PNC complicated by cervical fibroid. States that she has been cleared by [facility] for a vaginal delivery     OBHx:  - . VD. c/b PROM @ 37.2wga. Otherwise uncomplicated  GynHx: Cervical fibroid as above   PMHx: Hypothyroidism 2/2 to thyroidectomy for thyroid cancer ()  PSHx: Tonsillectomy and thyroidectomy  Med: PNV, Levothyroxine 137mcg qd  All: NKDA, intolerance to Azithro  Psych: Denies hx of mental health issues  SH: Denies hx of smoking, drinking, or drug usage during the pregnancy    Vital Signs Last 24 Hrs  T(C): 36.7 (01 Sep 2022 23:33), Max: 36.7 (01 Sep 2022 20:49)  T(F): 98.06 (01 Sep 2022 23:33), Max: 98.1 (01 Sep 2022 20:49)  HR: 59 (01 Sep 2022 23:34) (58 - 79)  BP: 122/76 (01 Sep 2022 23:34) (122/76 - 128/86)  BP(mean): --  RR: 16 (01 Sep 2022 20:56) (16 - 17)  SpO2: 99% (01 Sep 2022 23:20) (94% - 100%)        SVE: 5/70/-3  FHT: 130/mod/(+)accels/(-)decels  [place]: q3min  Sono: Vertex

## 2022-09-02 NOTE — DISCHARGE NOTE OB - PERSISTENT HEADACHE, BLURRED VISION
Would you please let her know that this can be taken 4 mg, 3 times a day as needed for muscle pain.  Recommend that she take it only as needed. Thanks Statement Selected

## 2022-09-02 NOTE — DISCHARGE NOTE OB - PATIENT PORTAL LINK FT
You can access the FollowMyHealth Patient Portal offered by [facility] by registering at the following website: [email or web address]. By joining [employer]’s FollowMyHealth portal, you will also be able to view your health information using other applications (apps) compatible with our system.

## 2022-09-02 NOTE — OB RN DELIVERY SUMMARY - NS_SEPSISRSKCALC_OBGYN_ALL_OB_FT
EOS calculated successfully. EOS Risk Factor: 0.5/1000 live births ([facility] national incidence); GA=36w;Temp=99.1; ROM=5.4; GBS='Unknown'; Antibiotics='GBS specific antibiotics > 2 hrs prior to birth'

## 2022-09-02 NOTE — DISCHARGE NOTE OB - MATERIALS PROVIDED
[facility] [place] Screening Program/Breastfeeding Log/Breastfeeding Mother’s Support Group Information/Guide to Postpartum Care/[facility] Hearing Screen Program/Back To Sleep Handout/Breastfeeding Guide and Packet/Birth Certificate Instructions

## 2022-09-03 RX ORDER — LEVOTHYROXINE SODIUM 125 MCG
137 TABLET ORAL DAILY
Refills: 0 | Status: DISCONTINUED | OUTPATIENT
Start: 2022-09-03 | End: 2022-09-04

## 2022-09-03 RX ADMIN — Medication 975 MILLIGRAM(S): at 06:49

## 2022-09-03 RX ADMIN — Medication 975 MILLIGRAM(S): at 13:50

## 2022-09-03 RX ADMIN — Medication 600 MILLIGRAM(S): at 10:33

## 2022-09-03 RX ADMIN — Medication 975 MILLIGRAM(S): at 00:45

## 2022-09-03 RX ADMIN — Medication 975 MILLIGRAM(S): at 18:40

## 2022-09-03 RX ADMIN — Medication 600 MILLIGRAM(S): at 04:00

## 2022-09-03 RX ADMIN — Medication 975 MILLIGRAM(S): at 06:08

## 2022-09-03 RX ADMIN — Medication 600 MILLIGRAM(S): at 16:17

## 2022-09-03 RX ADMIN — Medication 600 MILLIGRAM(S): at 11:05

## 2022-09-03 RX ADMIN — Medication 975 MILLIGRAM(S): at 13:16

## 2022-09-03 RX ADMIN — Medication 975 MILLIGRAM(S): at 00:02

## 2022-09-03 RX ADMIN — Medication 600 MILLIGRAM(S): at 16:50

## 2022-09-03 RX ADMIN — Medication 600 MILLIGRAM(S): at 03:13

## 2022-09-04 VITALS
HEART RATE: 55 BPM | TEMPERATURE: 98 F | RESPIRATION RATE: 16 BRPM | DIASTOLIC BLOOD PRESSURE: 60 MMHG | SYSTOLIC BLOOD PRESSURE: 101 MMHG | OXYGEN SATURATION: 96 %

## 2022-09-04 PROCEDURE — 86900 BLOOD TYPING SEROLOGIC ABO: CPT

## 2022-09-04 PROCEDURE — 86880 COOMBS TEST DIRECT: CPT

## 2022-09-04 PROCEDURE — 86769 SARS-COV-2 COVID-19 ANTIBODY: CPT

## 2022-09-04 PROCEDURE — G0463: CPT

## 2022-09-04 PROCEDURE — 88307 TISSUE EXAM BY PATHOLOGIST: CPT

## 2022-09-04 PROCEDURE — 59025 FETAL NON-STRESS TEST: CPT

## 2022-09-04 PROCEDURE — 86870 RBC ANTIBODY IDENTIFICATION: CPT

## 2022-09-04 PROCEDURE — 86850 RBC ANTIBODY SCREEN: CPT

## 2022-09-04 PROCEDURE — 86901 BLOOD TYPING SEROLOGIC RH(D): CPT

## 2022-09-04 PROCEDURE — 59050 FETAL MONITOR W/REPORT: CPT

## 2022-09-04 PROCEDURE — 86905 BLOOD TYPING RBC ANTIGENS: CPT

## 2022-09-04 RX ORDER — FAMOTIDINE 10 MG/ML
20 INJECTION INTRAVENOUS DAILY
Refills: 0 | Status: DISCONTINUED | OUTPATIENT
Start: 2022-09-04 | End: 2022-09-04

## 2022-09-04 RX ORDER — ACETAMINOPHEN 500 MG
3 TABLET ORAL
Qty: 0 | Refills: 0 | DISCHARGE
Start: 2022-09-04

## 2022-09-04 RX ORDER — IBUPROFEN 200 MG
1 TABLET ORAL
Qty: 0 | Refills: 0 | DISCHARGE
Start: 2022-09-04

## 2022-09-04 RX ORDER — LANOLIN
1 OINTMENT (GRAM) TOPICAL
Qty: 0 | Refills: 0 | DISCHARGE
Start: 2022-09-04

## 2022-09-04 RX ADMIN — FAMOTIDINE 20 MILLIGRAM(S): 10 INJECTION INTRAVENOUS at 03:42

## 2022-09-04 NOTE — PROGRESS NOTE ADULT - SUBJECTIVE AND OBJECTIVE BOX
S: Patient doing well. No complaints. Minimal lochia. Pain controlled.  she is breast feeding    O: Vital Signs Last 24 Hrs  T(C): 36.7 (04 Sep 2022 07:00), Max: 36.7 (03 Sep 2022 16:35)  T(F): 98.1 (04 Sep 2022 07:00), Max: 98.1 (03 Sep 2022 16:35)  HR: 55 (04 Sep 2022 07:00) (55 - 61)  BP: 101/60 (04 Sep 2022 07:00) (101/60 - 112/76)  BP(mean): --  RR: 16 (04 Sep 2022 07:00) (16 - 18)  SpO2: 96% (04 Sep 2022 07:00) (96% - 96%)    Parameters below as of 04 Sep 2022 07:00  Patient On (Oxygen Delivery Method): room air        Gen: NAD  Abd: soft, Nontender, Nondistended, fundus firm  Ext: no tendern, mild edema    Labs:      
R1 Progress Note    Patient seen and examined at bedside, no acute overnight events. No acute complaints, pain well controlled. Patient is ambulating, voiding spontaneously, passing gas, and tolerating regular diet. Denies CP, SOB, N/V, HA, blurred vision, epigastric pain.    Vital Signs Last 24 Hours  T(C): 36.9 (09-03-22 @ 06:08), Max: 37.3 (09-02-22 @ 07:48)  HR: 58 (09-03-22 @ 06:08) (55 - 121)  BP: 100/65 (09-03-22 @ 06:08) (94/60 - 116/70)  RR: 18 (09-03-22 @ 06:08) (17 - 18)  SpO2: 96% (09-03-22 @ 06:08) (82% - 100%)    Physical Exam:  General: NAD  Abdomen: Soft, non-tender, non-distended, fundus firm  Pelvic: Lochia wnl    Labs:    Blood Type: O Negative  Antibody Screen: Positive  RPR: Negative               13.4   12.42 )-----------( 292      ( 09-01 @ 22:52 )             40.4         MEDICATIONS  (STANDING):  acetaminophen     Tablet .. 975 milliGRAM(s) Oral <User Schedule>  diphtheria/tetanus/pertussis (acellular) Vaccine (ADAcel) 0.5 milliLiter(s) IntraMuscular once  ibuprofen  Tablet. 600 milliGRAM(s) Oral every 6 hours  levothyroxine 137 MICROGram(s) Oral daily  oxytocin Infusion. 2 milliUNIT(s)/Min (2 mL/Hr) IV Continuous <Continuous>  prenatal multivitamin 1 Tablet(s) Oral daily  sodium chloride 0.9% lock flush 3 milliLiter(s) IV Push every 8 hours    MEDICATIONS  (PRN):  benzocaine 20%/menthol 0.5% Spray 1 Spray(s) Topical every 6 hours PRN for Perineal discomfort  dibucaine 1% Ointment 1 Application(s) Topical every 6 hours PRN Perineal discomfort  diphenhydrAMINE 25 milliGRAM(s) Oral every 6 hours PRN Pruritus  hydrocortisone 1% Cream 1 Application(s) Topical every 6 hours PRN Moderate Pain (4-6)  lanolin Ointment 1 Application(s) Topical every 6 hours PRN nipple soreness  magnesium hydroxide Suspension 30 milliLiter(s) Oral two times a day PRN Constipation  oxyCODONE    IR 5 milliGRAM(s) Oral every 3 hours PRN Moderate to Severe Pain (4-10)  oxyCODONE    IR 5 milliGRAM(s) Oral once PRN Moderate to Severe Pain (4-10)  pramoxine 1%/zinc 5% Cream 1 Application(s) Topical every 4 hours PRN Moderate Pain (4-6)  simethicone 80 milliGRAM(s) Chew every 4 hours PRN Gas  witch hazel Pads 1 Application(s) Topical every 4 hours PRN Perineal discomfort

## 2022-09-04 NOTE — PROGRESS NOTE ADULT - ASSESSMENT
33y/o  PPD#1 from  in stable condition. PMHx significant for hypothyroidism 2/2 to thyroidectomy for thyroid cancer. Patient is progressing well postpartum.    #Hypothyroidism 2/2 thyroidectomy  - c/w Synthroid 137 mcg qd    #postpartum state  - Continue with po analgesia  - Increase ambulation  - Continue regular diet  - IV lock  - No labs    William Duarte  PGY-1
A: 32y PPD#2 s/p  doing well.    Plan:  Routine postpartum care  Encouraged out of bed  Regular diet

## 2022-09-06 ENCOUNTER — NON-APPOINTMENT (OUTPATIENT)
Age: 32
End: 2022-09-06

## 2022-09-19 ENCOUNTER — APPOINTMENT (OUTPATIENT)
Dept: ANTEPARTUM | Facility: CLINIC | Age: 32
End: 2022-09-19

## 2022-09-19 LAB — SURGICAL PATHOLOGY STUDY: SIGNIFICANT CHANGE UP

## 2022-10-01 ENCOUNTER — EMERGENCY (EMERGENCY)
Facility: HOSPITAL | Age: 32
LOS: 1 days | Discharge: HOME | End: 2022-10-01
Attending: EMERGENCY MEDICINE
Payer: COMMERCIAL

## 2022-10-01 VITALS
RESPIRATION RATE: 16 BRPM | OXYGEN SATURATION: 94 % | WEIGHT: 134.04 LBS | HEART RATE: 77 BPM | HEIGHT: 62 IN | TEMPERATURE: 98 F | DIASTOLIC BLOOD PRESSURE: 93 MMHG | SYSTOLIC BLOOD PRESSURE: 131 MMHG

## 2022-10-01 DIAGNOSIS — E89.0 POSTPROCEDURAL HYPOTHYROIDISM: Chronic | ICD-10-CM

## 2022-10-01 PROCEDURE — 93971 EXTREMITY STUDY: CPT | Mod: 26,LT

## 2022-10-01 PROCEDURE — 93971 EXTREMITY STUDY: CPT

## 2022-10-01 PROCEDURE — 99284 EMERGENCY DEPT VISIT MOD MDM: CPT

## 2022-10-01 PROCEDURE — 99284 EMERGENCY DEPT VISIT MOD MDM: CPT | Mod: 25

## 2022-10-01 NOTE — ED PROVIDER NOTE - NS ED ROS FT
Review of Systems:  -General: no fever or chills  -ENT: no congestion, no difficulty swallowing  -Pulmonary: no cough, no shortness of breath  -Cardiac: no chest pain, no palpitations  -Gastrointestinal: no abdominal pain, no nausea, no vomiting, and no diarrhea.  -Genitourinary: no blood or pain with urination  -Musculoskeletal: no back or neck pain; +L groin/hip pain  -Skin: no rashes  -Endocrine: No h/o diabetes or thyroid disease  -Neurologic: No new weakness or numbness in extremities    All else negative unless otherwise specified elsewhere in this note.

## 2022-10-01 NOTE — ED PROVIDER NOTE - PHYSICAL EXAMINATION
On Physical Exam:  General: well appearing, in NAD, speaking clearly in full sentences and without difficulty; cooperative with exam  HEENT: airway patent  Abdomen: soft nontender/nondistended  : no bladder tenderness or distension  Skin: intact, no rash  Extremities: no peripheral edema, no gross deformities; L groin, ~2cm palpable nodule, tenderness to palpation; no gross peripheral edema (? localized groin edema/induration; L buttocks 1cm region of erythema (not tracking, no palpable fluctuance/crepitance)

## 2022-10-01 NOTE — ED PROVIDER NOTE - OBJECTIVE STATEMENT
Attending note (Nitish): 31 y/o F with no reported medical comorbidities, c/o tenderness and swelling of the left hip/groin. 4 weeks s/p  w/o complications; last week had bug bite on left hip/buttocks that became red/swollen; and then began having swelling of the groin/thigh; saw brayden who prescribed keflex, and completed keflex yesterday however no change in symptoms, still very tender.  No h/o dvt/pe.  No allergies.  Able to walk/bear weight, no n/v/d. No abd pain. Reports some pelvic cramping but is improving since delivery, no active vaginal bleeding or discharge.

## 2022-10-01 NOTE — ED PROVIDER NOTE - PATIENT PORTAL LINK FT
You can access the FollowMyHealth Patient Portal offered by [facility] by registering at the following website: [email or web address]. By joining [facility]’s FollowMyHealth portal, you will also be able to view your health information using other applications (apps) compatible with our system.

## 2022-10-01 NOTE — ED PROVIDER NOTE - ATTENDING CONTRIBUTION TO CARE
Attending note (Nitish): L groin swelling/tenderness; possible lymphadenitis; eval further with US to r/o DVT; if no evidence of DVT would add doxycycline for additional coverage against staph bacteria, and dc with strict return precautions.

## 2022-10-01 NOTE — ED PROVIDER NOTE - CLINICAL SUMMARY MEDICAL DECISION MAKING FREE TEXT BOX
Attending note (Nitish): L groin swelling/tenderness; possible lymphadenitis; eval further with US to r/o DVT; if no evidence of DVT would expan to doxycycline for additional coverage against staph bacteria, and dc with strict return precautions. Attending note (Nitish): L groin swelling/tenderness; possible lymphadenitis; eval further with US to r/o DVT; if no evidence of DVT would add doxycycline for additional coverage against staph bacteria, and dc with strict return precautions.

## 2022-10-01 NOTE — ED PROVIDER NOTE - NSFOLLOWUPINSTRUCTIONS_ED_ALL_ED_FT
Lymphadenitis    You were seen in the ED for your leg swelling and pain and had an ultrasound which showed no sign of clot but showed an enlarged lymph node, Lymphadenitis. You were prescribed doxycycline, please take 1 tablet 2 times per day for a total of 7 days. Please do not breast feed for 12 days.     SEEK IMMEDIATE MEDICAL CARE IF YOU HAVE ANY OF THE FOLLOWING SYMPTOMS: swelling around the wound, worsening pain, drainage from the wound, red streaking going away from your wound, inability to move finger or toe near the laceration, or discoloration of skin near the laceration.

## 2022-10-01 NOTE — ED ADULT NURSE NOTE - OBJECTIVE STATEMENT
Patient is a 33 y/o female with PMH papillary thyroid carcinoma presenting to the ED with c/o wound check. Pt presents with left hip redness/swelling, completed course of ABX w no improvement. Pt well-appearing, A&Ox4, breathing unlabored, denies SOB, denies CP, skin normal color/warm, denies n/v/d, denies urinary symptoms, denies fever, cough, chills. Plan of care explained, warm blanket provided, call bell within reach, comfort and safety measures maintained.

## 2022-10-01 NOTE — ED PROVIDER NOTE - HIV OFFER

## 2022-10-01 NOTE — ED PROVIDER NOTE - CHIEF COMPLAINT
The patient is a 32y Female complaining of tenderness/swelling of the left groin. (not a wound check; incorrect triage).

## 2022-10-03 NOTE — ED PROVIDER NOTE - NS_EDPROVIDERDISPOUSERTYPE_ED_A_ED
October 3, 2022       Siva Borrego PA-C  [address]  [facility] [address]  Via In Basket      Patient: Gonzalo Nicolas   YOB: 1967   Date of Visit: 10/3/2022       Dear Dr. Borrego:    Thank you for referring Gonzalo Nicolas to me for evaluation. Below are my notes for this visit with him.    If you have questions, please do not hesitate to call me. I look forward to following your patient along with you.      Sincerely,        Kumar Lyons MD        CC: MD Kumar Sandhu Jr., MD  10/3/2022  4:36 PM  Signed  Colon and Rectal Surgery Consult Note    Patient ID: Gonzalo is a 55 year old male.    Chief Complaint: Hemorrhoids; New Patient     HPI   The patient is a 55-year-old male with a past medical history significant for arthritis, anxiety, hyperlipidemia, hypertension, and irritable bowel syndrome who presents to the office with a 1 month complaint of a lump near his anus.  He states that the lump has been periodically causing pain, pruritus, and purulent discharge.  He states that his symptoms have not improved with increasing his dietary fiber.  He does not take a fiber supplement regularly.  He drinks less than 2 L of water a day.  He has sought multiple providers for his anorectal symptoms.  Most recently he underwent a colonoscopy with Dr. Nelson which was reportedly normal.  He states that he typically has 4-5 bowel movements a day.  He was seen in the emergency room and found to have symptoms related to his hemorrhoids but notes that over-the-counter medications have been ineffective.  He presents to the office today to discuss definitive treatment options for his presumed hemorrhoids.      Gonzalo has a past medical history of Essential (primary) hypertension.    He has no past medical history of Alzheimer's dementia (CMS/[facility]), Anemia, Anxiety, Arthritis, Asthma, Attention deficit disorder, Blood clot associated with vein wall inflammation,  Bronchitis, Cerebral infarction (CMS/HCC), Chronic kidney disease, Chronic pain, Congestive cardiac failure (CMS/HCC), COPD (chronic obstructive pulmonary disease) (CMS/[facility]), Coronary artery disease, Depression, Diabetes mellitus (CMS/[facility]), Difficult intubation, Failed moderate sedation during procedure, Fracture, Gastroesophageal reflux disease, High cholesterol, Inflammatory bowel disease, Liver disease, Malignant hyperthermia, Malignant neoplasm (CMS/[facility]), Motion sickness, MRSA (methicillin resistant Staphylococcus aureus), Myocardial infarction (CMS/[facility]), Osteoporosis, Otitis media, Pneumonia, PONV (postoperative nausea and vomiting), Sinusitis, chronic, Sleep apnea, Spinal headache, Thyroid condition, Transfusion reaction, Uncomplicated senile dementia (CMS/[facility]), Urinary incontinence, or Urinary tract infection.  Gonzalo has Sepsis (CMS/[facility]); Acute diverticulitis; and Obesity (BMI 30-39.9) on their problem list.  Gonzalo has a past surgical history that includes Appendectomy.  His family history is not on file.  Gonzalo reports that he has never smoked. He has never used smokeless tobacco. He reports that he does not drink alcohol and does not use drugs.  Gonzalo has a current medication list which includes the following prescription(s): pantoprazole, vitamin b-12, alprazolam, and metoprolol succinate.  Gonzalo   Current Outpatient Medications   Medication Sig Dispense Refill   • pantoprazole (PROTONIX) 40 MG tablet TAKE 1 TABLET BY MOUTH ONCE DAILY BEFORE MEAL(S) FOR 30 DAYS     • vitamin B-12 (CYANOCOBALAMIN) 1000 MCG tablet Take 1,000 mcg by mouth daily. Indications: Inadequate Vitamin B12     • ALPRAZolam (XANAX) 1 MG tablet Take 1 mg by mouth nightly as needed for Sleep.     • metoPROLOL succinate (TOPROL-XL) 25 MG 24 hr tablet Take 25 mg by mouth nightly.       No current facility-administered medications for this visit.     Gonzalo has No Known Allergies.    Review of Systems   Constitutional: Negative.     HENT: Negative.    Eyes: Negative.    Respiratory: Negative.    Cardiovascular: Negative.    Gastrointestinal: Positive for rectal pain. Negative for abdominal distention, abdominal pain, anal bleeding, blood in stool, constipation, diarrhea, nausea and vomiting.   Endocrine: Negative.    Genitourinary: Negative.    Musculoskeletal: Negative.    Skin: Negative.    Neurological: Negative.    Psychiatric/Behavioral: Negative.      Objective   Physical Exam  Vitals and nursing note reviewed.   Constitutional:       General: He is not in acute distress.     Appearance: He is well-developed. He is not diaphoretic.   HENT:      Head: Normocephalic and atraumatic.      Neck: Normal range of motion and neck supple.   Eyes:      Pupils: Pupils are equal, round, and reactive to light.   Neck:      Thyroid: No thyromegaly.      Trachea: No tracheal deviation.   Cardiovascular:      Rate and Rhythm: Normal rate and regular rhythm.      Heart sounds: Normal heart sounds. No murmur heard.    No gallop.   Pulmonary:      Effort: Pulmonary effort is normal. No respiratory distress.      Breath sounds: Normal breath sounds. No wheezing.   Chest:      Chest wall: No tenderness.   Abdominal:      General: Bowel sounds are normal. There is no distension.      Palpations: Abdomen is soft.      Tenderness: There is no abdominal tenderness. There is no guarding or rebound.      Hernia: No hernia is present.   Genitourinary:     Comments: Rectal exam:  Inflamed anterior midline anal skin tag with external opening of fistula located approximately 3 cm from the anal verge, normal tone at rest and squeeze, no evidence of masses appreciated digital rectal exam, possible internal opening located at the dentate line in the anterior midline visualized on anoscopic examination, no evidence of proctitis  Musculoskeletal:         General: Normal range of motion.   Lymphadenopathy:      Cervical: No cervical adenopathy.   Skin:     General: Skin is  warm and dry.   Neurological:      Mental Status: He is alert and oriented to person, place, and time.   Psychiatric:         Behavior: Behavior normal.         Thought Content: Thought content normal.         Judgment: Judgment normal.          Results:    EXAM: CT ABDOMEN PELVIS W CONTRAST     CLINICAL INDICATION: Abdominal pain.  Fever.      COMPARISON: CT abdomen pelvis on 12/29/2021.     PROCEDURE/TECHNIQUE:     CT of the abdomen and pelvis was performed utilizing standard protocol  following the administration of non-ionic intravenous contrast material.   Sagittal and coronal reconstructed images were acquired.      CONTRAST:   Name: Omnipaque 300  Volume: 85 mL     Oral contrast: Was given  .     FINDINGS:     Lung bases: Dependent atelectasis or scarring at the lung bases     Interval progression of complicated sigmoid diverticulitis with interval  increase in loculated air-fluid collection along the posterior inferior  aspect of the thickened sigmoid colon.  The largest component measures up  to 6.1 x 1.8 cm.  There is persistent presacral inflammatory change or  edema.     Scattered colonic diverticulosis.     Liver: Diffuse hypoattenuation of the liver.  Few low-attenuation areas in  the left hepatic lobe statistically most likely represents cysts or  hemangiomas.  Pancreas: Unremarkable  Spleen: Unremarkable   Adrenal Glands: Unremarkable     Gallbladder: Probable gallstones.     Kidneys: No obstructive uropathy.     Urinary bladder: Grossly unremarkable     OSSEOUS STRUCTURES: No destructive osseous lesions.     Other findings: None.     IMPRESSION:     1.   Interval progression of complicated sigmoid diverticulitis with  interval developing of air-fluid abscess collection (or contained  perforation) within the pelvis.  Findings notified via perfect serve  message Dr. VIJAYA Mendoza at 4:06 PM on 12/31/2021.  2.   Probable cholelithiasis.  Consider right upper quadrant ultrasound as  clinically  warranted.  3.   Few low-attenuation areas in the left hepatic lobe statistically most  likely represents cysts or hemangiomas.        FOR PHYSICIAN USE ONLY - Please note that this report was generated using  voice recognition software.  If you require clarification or feel that  there has been an error in this report please contact me through Perfect  [employer].  Thank you very much for allowing me to participate in the care of  your patient.     Electronically Signed by: LUPEFRANKLIN GELA   Signed on: 12/31/2021 4:08 PM      Assessment   Problem List Items Addressed This Visit        Gastrointestinal and Abdominal    Anal fistula - Primary     1. The patient was informed that the symptoms are most consistent with an anal fistula.   2. I recommend beginning a High fiber diet (30 g of  fiber daily) that is rich in whole grain oats, raw leafy greens, fruits and vegetables  3.  The patient was encouraged to increase water intake to 64 ounces of water daily  4.  I recommend Benefiber 1 tablespoon with 8 ounces of water twice a day  5. Warm Sitz baths 2-3 times a day  6.  I recommend an examination under anesthesia with ligation of intersphincteric fistula tract versus rectal advancement flap versus dermal advancement flap versus incision and drainage of abscess with seton placement versus fistulotomy. The patient was informed of the risks and benefits of surgery. The risks include but are not limited to pain, bleeding, damage to surrounding tissues, need for reoperation, incontinence, recurrence, prolonged wound healing, and anesthetic complications  7. The patient is to call the office with the preferred surgical date  8. Medical clearance for low risk outpatient surgery    Kumar Lyons MD [facility]  Colon and Rectal Surgery, Department of Surgery  Surgical Director of Digestive Health  [address]; [address] [place] [id number]  Ph: [phone]  Fax: [phone]                    Attending Attestation (For Attendings USE Only)...

## 2023-05-18 NOTE — PRE-ANESTHESIA EVALUATION ADULT - NSANTHNECKRD_ENT_A_CORE
No

## 2023-10-19 NOTE — DISCHARGE NOTE OB - NS OB DC IMMUNIZATIONS MMR YN
Subjective   Patient ID: Juan Wallace is a 78 y.o. male who presents for Follow-up (Pt here for office visit).    HPI patient had exploratory laparoscopy and few adhesions for Procan but there were not too many occasions he still gets pain and also gets pain in the left lower quadrant radiating towards umbilicus he had colonoscopy done that was unremarkable he could have spastic colon    Review of Systems   Constitutional: Negative.    HENT: Negative.     Respiratory: Negative.     Cardiovascular:         HFPEF   Gastrointestinal:  Positive for abdominal pain.   Endocrine: Negative.    Genitourinary: Negative.    All other systems reviewed and are negative.      Objective   /74   Pulse 68   Temp 36.7 °C (98 °F) (Temporal)   Wt 90.3 kg (199 lb)   BMI 30.30 kg/m²     Physical Exam  Vitals reviewed.   Constitutional:       Appearance: Normal appearance.   Eyes:      Conjunctiva/sclera: Conjunctivae normal.   Cardiovascular:      Rate and Rhythm: Normal rate and regular rhythm.      Heart sounds: Normal heart sounds.   Pulmonary:      Effort: Pulmonary effort is normal.      Breath sounds: Normal breath sounds.   Neurological:      Mental Status: He is alert.       Assessment/Plan   Problem List Items Addressed This Visit             ICD-10-CM    Parkinson's disease without dyskinesia, with fluctuating manifestations G20.A2    Other congestive heart failure (CMS/HCC) - Primary I50.9    Benign essential hypertension I10    LLQ pain R10.32    Hyperlipidemia E78.5    Primary hypertension I10   We will give trial of low-dose dicyclomine if that does not work we can try hyoscyamine.  Patient will be referred to Dr. Heck for second opinion.  He will continue his other blood pressure medicine including amlodipine we will continue Prevacid for GERD he is on labetalol as well for hypertension.  We will continue Jardiance for diabetes and diastolic CHF.  He will continue finasteride for BPH with LUTS.        Titers positive/No

## 2023-11-28 NOTE — ED ADULT NURSE NOTE - NS ED NURSE DC PT EDUCATION RESOURCES
Mr. Herzog Jim,    You were seen by Dr. Noble and Dr. Vance for evaluation of Parkinson's disease. You appear to have tremor predominant type and have had mild progression over the past 7 years. We would recommend continuing exercise. There is a non-profit program called [employer] which has exercise routine's targeted for Parkinson's. Their website is www.Sportingo. [employer] is another such exercise program. Their website is [email or web address]. We discussed potentially starting a medication to treat the tremor. At this time you decided to defer treatment, but if your symptoms become bothersome, please call our office to start treatment. We also discussed some Parkinson's disease studies involving genetics research and a saliva test for Parkinson's. Our research coordinator will reach out to you regarding these studies.    It was a pleasure seeing you,  [facility]   None needed

## 2023-12-20 NOTE — ED ADULT TRIAGE NOTE - GLASGOW COMA SCALE: EYE OPENING, MLM
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 Crohn's disease s/p multiple small bowel resection, stricturoplasties, steroid-induced diabetes    Current immunosuppression regimen per Kidney transplant protocol:  MAINTENANCE:   - Mycophenolate sodium 540 mg PO BID (On mycophenolate sodium PTA in the setting of significant GI history with Crohn's)  - Tacrolimus with goal trough levels of 8-10 mcg/L for first 6 months post-transplant. Most recent tacrolimus level was 5 mcg/L on 12/20. Tacrolimus dose increased to 5 mg PO twice daily at the time of discharge.  Of note, patient's PTA tacrolimus dose was 5 mg PO twice daily.     Opportunistic pathogen prophylaxis includes:  - PJP: dapsone 50 mg PO daily  - CMV D+/R+: Valganciclovir for 3 months duration tentatively. Dose prior to discharge is Valganciclovir 450 mg by mouth every Monday and Thursday.     Pharmacy has monitored for medication interactions and immunosuppression levels in conjunction with the multidisciplinary team. In anticipation for discharge, medication therapy needs have been addressed daily throughout the current admission via multidisciplinary rounds and/or discussions, order verification, daily clinical pharmacy review, and communication with prescribers.  Kings Gonzales, PharmD  PGY-1 Pharmacy Resident        
(E4) spontaneous

## 2024-01-06 ENCOUNTER — NON-APPOINTMENT (OUTPATIENT)
Age: 34
End: 2024-01-06

## 2024-04-26 NOTE — ED ADULT NURSE NOTE - NSFALLRSKINDICATORS_ED_ALL_ED
----- Message from Judith Gilbert sent at 4/24/2024 11:04 AM EDT -----  Regarding: Preauthorization Trulicity  Contact: [phone]  [facility] just told me that I require a pre-authorization for the Prescription Trulicity that Dr. Miller sent 2 weeks ago. Please send the required paperwork as soon as possible.    Also, I am nearly out of my test strips and I didn't see them on my refill list.  They are ONE TOUCH ULTRA. [facility] does not provide testing supplies, so could you please send that to [facility] on the corner of [address] in [place].    Thank you!   no

## 2024-05-14 NOTE — OB PROVIDER TRIAGE NOTE - NS_DISPOSITION_OBGYN_ALL_OB
Subjective   Reason for Visit: Wale Pascal is an 63 y.o. male here for a Medicare Wellness visit.     Past Medical, Surgical, and Family History reviewed and updated in chart.    Reviewed all medications by prescribing practitioner or clinical pharmacist (such as prescriptions, OTCs, herbal therapies and supplements) and documented in the medical record.    HPI  Hypertension - Takes medication without side effects. No CP/SOB/Palpitations. Follows a no added salt diet. Counseled diet, activity and weight loss.  GERD - Takes PPI daily with no breakthrough symptoms. Reviewed dietary, caffeine, tobacco, alcohol, and NSAID avoidance.  Cbc and cmp  Recommended pneumonia shot and the shingles shot  Recommend yearly flu shot and COVID shot in the fall.  Recommend Cologuard or colonoscopy.  Did not complete the Cologuard last year.    Patient Care Team:  Howie Harp MD as PCP - General     Review of Systems   Constitutional:  Negative for fatigue.   Eyes:  Negative for visual disturbance.   Respiratory:  Negative for cough, chest tightness and shortness of breath.    Cardiovascular:  Negative for chest pain and palpitations.   Gastrointestinal:  Negative for abdominal pain, blood in stool, constipation and diarrhea.   Genitourinary:  Negative for difficulty urinating and hematuria.   Skin:  Negative for rash.   Neurological:  Negative for dizziness and headaches.   Psychiatric/Behavioral:  Negative for sleep disturbance. The patient is not nervous/anxious.        Objective   Vitals:  /74   Pulse 73   Ht 1.829 m (6')   Wt 83.6 kg (184 lb 6.4 oz)   SpO2 98%   BMI 25.01 kg/m²       Physical Exam  Constitutional:       Appearance: Normal appearance.   HENT:      Head: Normocephalic and atraumatic.   Cardiovascular:      Rate and Rhythm: Normal rate and regular rhythm.      Heart sounds: Normal heart sounds.   Pulmonary:      Effort: Pulmonary effort is normal.      Breath sounds: Normal breath sounds.    Skin:     General: Skin is warm and dry.   Neurological:      General: No focal deficit present.      Mental Status: He is alert and oriented to person, place, and time.   Psychiatric:         Mood and Affect: Mood normal.         Behavior: Behavior normal.         Thought Content: Thought content normal.         Judgment: Judgment normal.         Assessment/Plan   Problem List Items Addressed This Visit       Erectile dysfunction of nonorganic origin    Relevant Medications    sildenafil (Viagra) 100 mg tablet    GERD (gastroesophageal reflux disease)    Relevant Medications    omeprazole (PriLOSEC) 40 mg DR capsule    Hypertension    Relevant Medications    amLODIPine (Norvasc) 10 mg tablet    Other Relevant Orders    CBC    Comprehensive Metabolic Panel     Other Visit Diagnoses       Routine general medical examination at health care facility    -  Primary    Nocturia        Relevant Orders    Prostate Specific Antigen                Continue to Observe Discharge

## 2024-06-14 NOTE — ED PROVIDER NOTE - COVID-19 ORDERING FACILITY
DISPLAY PLAN FREE TEXT DISPLAY PLAN FREE TEXT DISPLAY PLAN FREE TEXT DISPLAY PLAN FREE TEXT DISPLAY PLAN FREE TEXT DISPLAY PLAN FREE TEXT DISPLAY PLAN FREE TEXT DISPLAY PLAN FREE TEXT DISPLAY PLAN FREE TEXT DISPLAY PLAN FREE TEXT DISPLAY PLAN FREE TEXT DISPLAY PLAN FREE TEXT DISPLAY PLAN FREE TEXT DISPLAY PLAN FREE TEXT DISPLAY PLAN FREE TEXT DISPLAY PLAN FREE TEXT DISPLAY PLAN FREE TEXT DISPLAY PLAN FREE TEXT DISPLAY PLAN FREE TEXT DISPLAY PLAN FREE TEXT [facility] DISPLAY PLAN FREE TEXT DISPLAY PLAN FREE TEXT DISPLAY PLAN FREE TEXT DISPLAY PLAN FREE TEXT DISPLAY PLAN FREE TEXT DISPLAY PLAN FREE TEXT DISPLAY PLAN FREE TEXT DISPLAY PLAN FREE TEXT DISPLAY PLAN FREE TEXT DISPLAY PLAN FREE TEXT DISPLAY PLAN FREE TEXT DISPLAY PLAN FREE TEXT DISPLAY PLAN FREE TEXT DISPLAY PLAN FREE TEXT DISPLAY PLAN FREE TEXT DISPLAY PLAN FREE TEXT DISPLAY PLAN FREE TEXT

## 2024-06-19 ENCOUNTER — EMERGENCY (EMERGENCY)
Facility: HOSPITAL | Age: 34
LOS: 1 days | Discharge: HOME | End: 2024-06-19
Attending: EMERGENCY MEDICINE | Admitting: EMERGENCY MEDICINE
Payer: COMMERCIAL

## 2024-06-19 VITALS
WEIGHT: 130.07 LBS | SYSTOLIC BLOOD PRESSURE: 117 MMHG | OXYGEN SATURATION: 98 % | DIASTOLIC BLOOD PRESSURE: 84 MMHG | HEIGHT: 61 IN | HEART RATE: 82 BPM | RESPIRATION RATE: 16 BRPM | TEMPERATURE: 98 F

## 2024-06-19 DIAGNOSIS — E89.0 POSTPROCEDURAL HYPOTHYROIDISM: Chronic | ICD-10-CM

## 2024-06-19 LAB
ALBUMIN SERPL ELPH-MCNC: 4.3 G/DL — SIGNIFICANT CHANGE UP (ref 3.3–5)
ALP SERPL-CCNC: 73 U/L — SIGNIFICANT CHANGE UP (ref 40–120)
ALT FLD-CCNC: 14 U/L — SIGNIFICANT CHANGE UP (ref 10–45)
ANION GAP SERPL CALC-SCNC: 14 MMOL/L — SIGNIFICANT CHANGE UP (ref 5–17)
AST SERPL-CCNC: 22 U/L — SIGNIFICANT CHANGE UP (ref 10–40)
BASE EXCESS BLDV CALC-SCNC: 2 MMOL/L — SIGNIFICANT CHANGE UP (ref -2–3)
BASOPHILS # BLD AUTO: 0.05 K/UL — SIGNIFICANT CHANGE UP (ref 0–0.2)
BASOPHILS NFR BLD AUTO: 0.8 % — SIGNIFICANT CHANGE UP (ref 0–2)
BILIRUB SERPL-MCNC: 0.7 MG/DL — SIGNIFICANT CHANGE UP (ref 0.2–1.2)
BUN SERPL-MCNC: 13 MG/DL — SIGNIFICANT CHANGE UP (ref 7–23)
CA-I SERPL-SCNC: 1.14 MMOL/L — LOW (ref 1.15–1.33)
CALCIUM SERPL-MCNC: 9 MG/DL — SIGNIFICANT CHANGE UP (ref 8.4–10.5)
CHLORIDE BLDV-SCNC: 102 MMOL/L — SIGNIFICANT CHANGE UP (ref 96–108)
CHLORIDE SERPL-SCNC: 100 MMOL/L — SIGNIFICANT CHANGE UP (ref 96–108)
CO2 BLDV-SCNC: 28 MMOL/L — HIGH (ref 22–26)
CO2 SERPL-SCNC: 22 MMOL/L — SIGNIFICANT CHANGE UP (ref 22–31)
CREAT SERPL-MCNC: 0.85 MG/DL — SIGNIFICANT CHANGE UP (ref 0.5–1.3)
EGFR: 92 ML/MIN/1.73M2 — SIGNIFICANT CHANGE UP
EOSINOPHIL # BLD AUTO: 0.07 K/UL — SIGNIFICANT CHANGE UP (ref 0–0.5)
EOSINOPHIL NFR BLD AUTO: 1.1 % — SIGNIFICANT CHANGE UP (ref 0–6)
GAS PNL BLDV: 132 MMOL/L — LOW (ref 136–145)
GAS PNL BLDV: SIGNIFICANT CHANGE UP
GAS PNL BLDV: SIGNIFICANT CHANGE UP
GLUCOSE BLDV-MCNC: 91 MG/DL — SIGNIFICANT CHANGE UP (ref 70–99)
GLUCOSE SERPL-MCNC: 94 MG/DL — SIGNIFICANT CHANGE UP (ref 70–99)
HCG SERPL-ACNC: <2 MIU/ML — SIGNIFICANT CHANGE UP
HCO3 BLDV-SCNC: 26 MMOL/L — SIGNIFICANT CHANGE UP (ref 22–29)
HCT VFR BLD CALC: 46.4 % — HIGH (ref 34.5–45)
HCT VFR BLDA CALC: 48 % — HIGH (ref 34.5–46.5)
HGB BLD CALC-MCNC: 16 G/DL — SIGNIFICANT CHANGE UP (ref 11.7–16.1)
HGB BLD-MCNC: 15.3 G/DL — SIGNIFICANT CHANGE UP (ref 11.5–15.5)
IMM GRANULOCYTES NFR BLD AUTO: 0.3 % — SIGNIFICANT CHANGE UP (ref 0–0.9)
LACTATE BLDV-MCNC: 1 MMOL/L — SIGNIFICANT CHANGE UP (ref 0.5–2)
LIDOCAIN IGE QN: 22 U/L — SIGNIFICANT CHANGE UP (ref 7–60)
LYMPHOCYTES # BLD AUTO: 1.64 K/UL — SIGNIFICANT CHANGE UP (ref 1–3.3)
LYMPHOCYTES # BLD AUTO: 26.2 % — SIGNIFICANT CHANGE UP (ref 13–44)
MCHC RBC-ENTMCNC: 29 PG — SIGNIFICANT CHANGE UP (ref 27–34)
MCHC RBC-ENTMCNC: 33 GM/DL — SIGNIFICANT CHANGE UP (ref 32–36)
MCV RBC AUTO: 87.9 FL — SIGNIFICANT CHANGE UP (ref 80–100)
MONOCYTES # BLD AUTO: 0.62 K/UL — SIGNIFICANT CHANGE UP (ref 0–0.9)
MONOCYTES NFR BLD AUTO: 9.9 % — SIGNIFICANT CHANGE UP (ref 2–14)
NEUTROPHILS # BLD AUTO: 3.85 K/UL — SIGNIFICANT CHANGE UP (ref 1.8–7.4)
NEUTROPHILS NFR BLD AUTO: 61.7 % — SIGNIFICANT CHANGE UP (ref 43–77)
NRBC # BLD: 0 /100 WBCS — SIGNIFICANT CHANGE UP (ref 0–0)
PCO2 BLDV: 40 MMHG — SIGNIFICANT CHANGE UP (ref 39–42)
PH BLDV: 7.43 — SIGNIFICANT CHANGE UP (ref 7.32–7.43)
PLATELET # BLD AUTO: 314 K/UL — SIGNIFICANT CHANGE UP (ref 150–400)
PO2 BLDV: 17 MMHG — LOW (ref 25–45)
POTASSIUM BLDV-SCNC: 4.1 MMOL/L — SIGNIFICANT CHANGE UP (ref 3.5–5.1)
POTASSIUM SERPL-MCNC: 4 MMOL/L — SIGNIFICANT CHANGE UP (ref 3.5–5.3)
POTASSIUM SERPL-SCNC: 4 MMOL/L — SIGNIFICANT CHANGE UP (ref 3.5–5.3)
PROT SERPL-MCNC: 7.9 G/DL — SIGNIFICANT CHANGE UP (ref 6–8.3)
RBC # BLD: 5.28 M/UL — HIGH (ref 3.8–5.2)
RBC # FLD: 13.2 % — SIGNIFICANT CHANGE UP (ref 10.3–14.5)
SAO2 % BLDV: 21.7 % — LOW (ref 67–88)
SODIUM SERPL-SCNC: 136 MMOL/L — SIGNIFICANT CHANGE UP (ref 135–145)
WBC # BLD: 6.25 K/UL — SIGNIFICANT CHANGE UP (ref 3.8–10.5)
WBC # FLD AUTO: 6.25 K/UL — SIGNIFICANT CHANGE UP (ref 3.8–10.5)

## 2024-06-19 PROCEDURE — 84132 ASSAY OF SERUM POTASSIUM: CPT

## 2024-06-19 PROCEDURE — 83690 ASSAY OF LIPASE: CPT

## 2024-06-19 PROCEDURE — 85018 HEMOGLOBIN: CPT

## 2024-06-19 PROCEDURE — 85025 COMPLETE CBC W/AUTO DIFF WBC: CPT

## 2024-06-19 PROCEDURE — 80053 COMPREHEN METABOLIC PANEL: CPT

## 2024-06-19 PROCEDURE — 82435 ASSAY OF BLOOD CHLORIDE: CPT

## 2024-06-19 PROCEDURE — 99284 EMERGENCY DEPT VISIT MOD MDM: CPT

## 2024-06-19 PROCEDURE — 82330 ASSAY OF CALCIUM: CPT

## 2024-06-19 PROCEDURE — 82803 BLOOD GASES ANY COMBINATION: CPT

## 2024-06-19 PROCEDURE — 99284 EMERGENCY DEPT VISIT MOD MDM: CPT | Mod: 25

## 2024-06-19 PROCEDURE — 76700 US EXAM ABDOM COMPLETE: CPT | Mod: 26

## 2024-06-19 PROCEDURE — 82947 ASSAY GLUCOSE BLOOD QUANT: CPT

## 2024-06-19 PROCEDURE — 84702 CHORIONIC GONADOTROPIN TEST: CPT

## 2024-06-19 PROCEDURE — 85014 HEMATOCRIT: CPT

## 2024-06-19 PROCEDURE — 83605 ASSAY OF LACTIC ACID: CPT

## 2024-06-19 PROCEDURE — 76700 US EXAM ABDOM COMPLETE: CPT

## 2024-06-19 PROCEDURE — 84295 ASSAY OF SERUM SODIUM: CPT

## 2024-06-19 NOTE — ED PROVIDER NOTE - PHYSICAL EXAMINATION
Gen: AAO x 3, NAD  Skin: No rashes or lesions  HEENT: NC/AT, PERRLA, EOMI, MMM  Resp: unlabored CTAB  Cardiac: rrr s1s2, no murmurs, rubs or gallops  GI: ND, +BS, Soft, mild epigastric ttp, Gen: AAO x 3, NAD  Skin: No rashes or lesions  HEENT: NC/AT, PERRLA, EOMI, MMM  Resp: unlabored CTAB  Cardiac: rrr s1s2, no murmurs, rubs or gallops  GI: ND, +BS, Soft, mild epigastric ttp,      Attn - alert, NAD, no pallor or jaundice,  moist mm, skin - warm and dry, Lungs - clear, no w/r/r, good BS bilaterally, Cor - rr, no M, no rub, Abdo - ND, soft, NT, no HSM, no CVAT, no guarding or rebound. Extremities - no edema, no calf tenderness, distal pulses intact and symmetrical, Neuro - intact and non-focal

## 2024-06-19 NOTE — ED ADULT NURSE NOTE - CCCP TRG CHIEF CMPLNT
Per chart review it appears that patient was called regarding scheduling a colonoscopy earlier this year but was unable to schedule at that time. He had a recent EGD in September 2018 with Dr. Lipscomb, which was significant for 3 columns of moderate size esophageal varices, stage II, irregular z-line, gastric erythema, possible gastric angiodysplasia, and duodenal angiodysplasia.     Patient likely needs colonoscopy for anemia and repeat EGD for esophageal varices and anemia. Will send to Dr. Lipscomb as FYI for review. Thank you.    abdominal pain

## 2024-06-19 NOTE — ED PROVIDER NOTE - RAPID ASSESSMENT
33-year-old female with past medical history of thyroid CA s/p resection presenting with abdominal pain.  Patient reports that she has had intermittent episodes of epigastric abdominal discomfort, nausea, diarrhea for the past several years.  Symptoms typically last a few days and then resolved.  Pain is worse with eating.  Patient states that she was being worked up for gastritis/PUD few years ago and was scheduled to have an endoscopy, but canceled it when she became pregnant.  Patient has not followed up with a gastroenterologist since.  Patient states over the past  24 hours she has had worsening epigastric discomfort with intermittent radiation to the right upper quadrant.  Pain is associated with nausea and a few episodes of diarrhea.  Patient feels dehydrated.    **Patient was rapidly assessed by me, Yadiel Corrales PA-C. A limited history was obtained. The patient will be seen and further examined/worked up in the main ED and their care will be completed by the main ED team. Receiving team will follow up on labs, analgesia, any clinical imaging, and perform reassessment and disposition of the patient as clinically indicated. All decisions regarding the progression of care will be made at their discretion.

## 2024-06-19 NOTE — ED PROVIDER NOTE - OBJECTIVE STATEMENT
33-year-old female with past medical history of thyroid CA s/p resection presenting with abdominal pain.  Patient reports that she has had intermittent episodes of epigastric abdominal discomfort, nausea, diarrhea for the past several years.  Symptoms typically last a few days and then resolved.  Pain is worse with eating.  Patient states that she was being worked up for gastritis/PUD few years ago and was scheduled to have an endoscopy, but canceled it when she became pregnant.  Patient has not followed up with a gastroenterologist since.  Patient states over the past  24 hours she has had worsening epigastric discomfort with intermittent radiation to the right upper quadrant.  Pain is associated with nausea and a few episodes of diarrhea.  Patient feels dehydrated. 33-year-old female with past medical history of thyroid CA s/p resection presenting with abdominal pain.  Patient reports that she has had intermittent episodes of epigastric abdominal discomfort, nausea, diarrhea for the past several years.  Symptoms typically last a few days and then resolved.  Pain is worse with eating.  Patient states that she was being worked up for gastritis/PUD few years ago and was scheduled to have an endoscopy, but canceled it when she became pregnant.  Patient has not followed up with a gastroenterologist since.  Patient states over the past  24 hours she has had worsening epigastric discomfort with intermittent radiation to the right upper quadrant.  Pain is associated with nausea and a few episodes of diarrhea.  Patient feels dehydrated.     Attending note.  Patient was seen in waiting room after labs and ultrasound were performed.  Patient complaining of epigastric discomfort which is intermittent.  She had 3-4 episodes of watery diarrhea today and yesterday.  She denies any abdominal surgery.  She was feeling lightheaded and dehydrated.  She denies any fever or GI bleeding.  Patient is feeling much better.  Labs are unremarkable.  Ultrasound was negative.  Patient started her period today

## 2024-06-19 NOTE — ED ADULT NURSE NOTE - OBJECTIVE STATEMENT
pt seen and discharged by DUNIA Corrales and MD Herr prior to RN initial assessment and vital signs. IV discontinued and discharge papers provided by DUNIA Corrales.

## 2024-06-19 NOTE — ED PROVIDER NOTE - PATIENT PORTAL LINK FT
You can access the FollowMyHealth Patient Portal offered by [facility] by registering at the following website: [email or web address]. By joining [employer]’s FollowMyHealth portal, you will also be able to view your health information using other applications (apps) compatible with our system.

## 2024-06-19 NOTE — ED PROVIDER NOTE - CLINICAL SUMMARY MEDICAL DECISION MAKING FREE TEXT BOX
Attending note.  Patient with intermittent epigastric pain associated with episodes of diarrhea yesterday and today which were nonbloody nonbilious.  She has no fevers or chills or urinary symptoms.  Labs are negative.  Ultrasound was negative for cholecystitis.  Patient encouraged to take PPI or H2 blocker and follow-up with GI.

## 2024-06-19 NOTE — ED PROVIDER NOTE - NSFOLLOWUPINSTRUCTIONS_ED_ALL_ED_FT
Stay well-hydrated.  Eat a bland diet and advance as tolerated.  Avoid fried, fatty, spicy, citrus foods  Take omeprazole 40 mg daily.  You can take Pepcid 20 mg as needed for persistent symptoms.  Follow-up with your primary care provider in 2 to 3 days.  Bring copy of your results with you to appointment.  Follow-up with your gastroenterologist within 1 to 2 weeks.  Call to schedule appointment.  Return to the ER for worsening pain, persistent vomiting, fevers or any other concerning symptoms.

## 2024-06-19 NOTE — ED PROVIDER NOTE - NSFOLLOWUPCLINICS_GEN_ALL_ED_FT
[facility]  Gastroenterology  [address] [place] [address] [address]  [place] [address]  Phone: [phone]  Fax:

## 2024-06-20 ENCOUNTER — EMERGENCY (EMERGENCY)
Facility: HOSPITAL | Age: 34
LOS: 1 days | Discharge: HOME | End: 2024-06-20
Attending: EMERGENCY MEDICINE | Admitting: EMERGENCY MEDICINE
Payer: COMMERCIAL

## 2024-06-20 VITALS
OXYGEN SATURATION: 98 % | WEIGHT: 132.06 LBS | DIASTOLIC BLOOD PRESSURE: 90 MMHG | TEMPERATURE: 99 F | HEIGHT: 61 IN | SYSTOLIC BLOOD PRESSURE: 126 MMHG | HEART RATE: 70 BPM | RESPIRATION RATE: 16 BRPM

## 2024-06-20 VITALS
TEMPERATURE: 98 F | HEART RATE: 60 BPM | DIASTOLIC BLOOD PRESSURE: 77 MMHG | OXYGEN SATURATION: 98 % | RESPIRATION RATE: 18 BRPM | SYSTOLIC BLOOD PRESSURE: 122 MMHG

## 2024-06-20 DIAGNOSIS — E89.0 POSTPROCEDURAL HYPOTHYROIDISM: Chronic | ICD-10-CM

## 2024-06-20 LAB
ALBUMIN SERPL ELPH-MCNC: 3.6 G/DL — SIGNIFICANT CHANGE UP (ref 3.3–5)
ALP SERPL-CCNC: 70 U/L — SIGNIFICANT CHANGE UP (ref 30–120)
ALT FLD-CCNC: 19 U/L — SIGNIFICANT CHANGE UP (ref 10–60)
ANION GAP SERPL CALC-SCNC: 8 MMOL/L — SIGNIFICANT CHANGE UP (ref 5–17)
APPEARANCE UR: CLEAR — SIGNIFICANT CHANGE UP
AST SERPL-CCNC: 28 U/L — SIGNIFICANT CHANGE UP (ref 10–40)
BACTERIA # UR AUTO: NEGATIVE /HPF — SIGNIFICANT CHANGE UP
BASOPHILS # BLD AUTO: 0.03 K/UL — SIGNIFICANT CHANGE UP (ref 0–0.2)
BASOPHILS NFR BLD AUTO: 0.4 % — SIGNIFICANT CHANGE UP (ref 0–2)
BILIRUB SERPL-MCNC: 0.6 MG/DL — SIGNIFICANT CHANGE UP (ref 0.2–1.2)
BILIRUB UR-MCNC: NEGATIVE — SIGNIFICANT CHANGE UP
BUN SERPL-MCNC: 13 MG/DL — SIGNIFICANT CHANGE UP (ref 7–23)
CALCIUM SERPL-MCNC: 8.3 MG/DL — LOW (ref 8.4–10.5)
CHLORIDE SERPL-SCNC: 100 MMOL/L — SIGNIFICANT CHANGE UP (ref 96–108)
CO2 SERPL-SCNC: 28 MMOL/L — SIGNIFICANT CHANGE UP (ref 22–31)
COLOR SPEC: YELLOW — SIGNIFICANT CHANGE UP
CREAT SERPL-MCNC: 0.86 MG/DL — SIGNIFICANT CHANGE UP (ref 0.5–1.3)
D DIMER BLD IA.RAPID-MCNC: <150 NG/ML DDU — SIGNIFICANT CHANGE UP
DIFF PNL FLD: ABNORMAL
EGFR: 90 ML/MIN/1.73M2 — SIGNIFICANT CHANGE UP
EOSINOPHIL # BLD AUTO: 0.12 K/UL — SIGNIFICANT CHANGE UP (ref 0–0.5)
EOSINOPHIL NFR BLD AUTO: 1.7 % — SIGNIFICANT CHANGE UP (ref 0–6)
EPI CELLS # UR: PRESENT
GLUCOSE SERPL-MCNC: 91 MG/DL — SIGNIFICANT CHANGE UP (ref 70–99)
GLUCOSE UR QL: NEGATIVE MG/DL — SIGNIFICANT CHANGE UP
HCG UR QL: NEGATIVE — SIGNIFICANT CHANGE UP
HCT VFR BLD CALC: 42.3 % — SIGNIFICANT CHANGE UP (ref 34.5–45)
HGB BLD-MCNC: 14 G/DL — SIGNIFICANT CHANGE UP (ref 11.5–15.5)
IMM GRANULOCYTES NFR BLD AUTO: 0.1 % — SIGNIFICANT CHANGE UP (ref 0–0.9)
KETONES UR-MCNC: ABNORMAL MG/DL
LEUKOCYTE ESTERASE UR-ACNC: ABNORMAL
LIDOCAIN IGE QN: 30 U/L — SIGNIFICANT CHANGE UP (ref 16–77)
LYMPHOCYTES # BLD AUTO: 2 K/UL — SIGNIFICANT CHANGE UP (ref 1–3.3)
LYMPHOCYTES # BLD AUTO: 28.7 % — SIGNIFICANT CHANGE UP (ref 13–44)
MCHC RBC-ENTMCNC: 28.6 PG — SIGNIFICANT CHANGE UP (ref 27–34)
MCHC RBC-ENTMCNC: 33.1 GM/DL — SIGNIFICANT CHANGE UP (ref 32–36)
MCV RBC AUTO: 86.3 FL — SIGNIFICANT CHANGE UP (ref 80–100)
MONOCYTES # BLD AUTO: 0.77 K/UL — SIGNIFICANT CHANGE UP (ref 0–0.9)
MONOCYTES NFR BLD AUTO: 11 % — SIGNIFICANT CHANGE UP (ref 2–14)
NEUTROPHILS # BLD AUTO: 4.04 K/UL — SIGNIFICANT CHANGE UP (ref 1.8–7.4)
NEUTROPHILS NFR BLD AUTO: 58.1 % — SIGNIFICANT CHANGE UP (ref 43–77)
NITRITE UR-MCNC: NEGATIVE — SIGNIFICANT CHANGE UP
NRBC # BLD: 0 /100 WBCS — SIGNIFICANT CHANGE UP (ref 0–0)
PH UR: 7.5 — SIGNIFICANT CHANGE UP (ref 5–8)
PLATELET # BLD AUTO: 318 K/UL — SIGNIFICANT CHANGE UP (ref 150–400)
POTASSIUM SERPL-MCNC: 3.3 MMOL/L — LOW (ref 3.5–5.3)
POTASSIUM SERPL-SCNC: 3.3 MMOL/L — LOW (ref 3.5–5.3)
PROT SERPL-MCNC: 7.4 G/DL — SIGNIFICANT CHANGE UP (ref 6–8.3)
PROT UR-MCNC: NEGATIVE MG/DL — SIGNIFICANT CHANGE UP
RBC # BLD: 4.9 M/UL — SIGNIFICANT CHANGE UP (ref 3.8–5.2)
RBC # FLD: 12.8 % — SIGNIFICANT CHANGE UP (ref 10.3–14.5)
RBC CASTS # UR COMP ASSIST: 11 /HPF — HIGH (ref 0–4)
SODIUM SERPL-SCNC: 136 MMOL/L — SIGNIFICANT CHANGE UP (ref 135–145)
SP GR SPEC: 1.01 — SIGNIFICANT CHANGE UP (ref 1–1.03)
TROPONIN I, HIGH SENSITIVITY RESULT: <4 NG/L — SIGNIFICANT CHANGE UP
UROBILINOGEN FLD QL: 1 MG/DL — SIGNIFICANT CHANGE UP (ref 0.2–1)
WBC # BLD: 6.97 K/UL — SIGNIFICANT CHANGE UP (ref 3.8–10.5)
WBC # FLD AUTO: 6.97 K/UL — SIGNIFICANT CHANGE UP (ref 3.8–10.5)
WBC UR QL: 5 /HPF — SIGNIFICANT CHANGE UP (ref 0–5)

## 2024-06-20 PROCEDURE — 80053 COMPREHEN METABOLIC PANEL: CPT

## 2024-06-20 PROCEDURE — 83690 ASSAY OF LIPASE: CPT

## 2024-06-20 PROCEDURE — 71045 X-RAY EXAM CHEST 1 VIEW: CPT | Mod: 26

## 2024-06-20 PROCEDURE — 93005 ELECTROCARDIOGRAM TRACING: CPT

## 2024-06-20 PROCEDURE — 81025 URINE PREGNANCY TEST: CPT

## 2024-06-20 PROCEDURE — 85025 COMPLETE CBC W/AUTO DIFF WBC: CPT

## 2024-06-20 PROCEDURE — 93010 ELECTROCARDIOGRAM REPORT: CPT | Mod: 76

## 2024-06-20 PROCEDURE — 81001 URINALYSIS AUTO W/SCOPE: CPT

## 2024-06-20 PROCEDURE — 85379 FIBRIN DEGRADATION QUANT: CPT

## 2024-06-20 PROCEDURE — 71045 X-RAY EXAM CHEST 1 VIEW: CPT

## 2024-06-20 PROCEDURE — 36415 COLL VENOUS BLD VENIPUNCTURE: CPT

## 2024-06-20 PROCEDURE — 87086 URINE CULTURE/COLONY COUNT: CPT

## 2024-06-20 PROCEDURE — 99285 EMERGENCY DEPT VISIT HI MDM: CPT | Mod: 25

## 2024-06-20 PROCEDURE — 96360 HYDRATION IV INFUSION INIT: CPT

## 2024-06-20 PROCEDURE — 99285 EMERGENCY DEPT VISIT HI MDM: CPT

## 2024-06-20 PROCEDURE — 84484 ASSAY OF TROPONIN QUANT: CPT

## 2024-06-20 RX ORDER — LEVOTHYROXINE SODIUM 125 MCG
1 TABLET ORAL
Refills: 0 | DISCHARGE

## 2024-06-20 RX ORDER — LEVOTHYROXINE SODIUM 125 MCG
1 TABLET ORAL
Qty: 0 | Refills: 0 | DISCHARGE

## 2024-06-20 RX ORDER — POTASSIUM CHLORIDE 20 MEQ
40 PACKET (EA) ORAL ONCE
Refills: 0 | Status: COMPLETED | OUTPATIENT
Start: 2024-06-20 | End: 2024-06-20

## 2024-06-20 RX ORDER — SODIUM CHLORIDE 9 MG/ML
1000 INJECTION INTRAMUSCULAR; INTRAVENOUS; SUBCUTANEOUS ONCE
Refills: 0 | Status: COMPLETED | OUTPATIENT
Start: 2024-06-20 | End: 2024-06-20

## 2024-06-20 RX ADMIN — Medication 40 MILLIEQUIVALENT(S): at 20:10

## 2024-06-20 RX ADMIN — SODIUM CHLORIDE 1000 MILLILITER(S): 9 INJECTION INTRAMUSCULAR; INTRAVENOUS; SUBCUTANEOUS at 19:30

## 2024-06-20 RX ADMIN — SODIUM CHLORIDE 1000 MILLILITER(S): 9 INJECTION INTRAMUSCULAR; INTRAVENOUS; SUBCUTANEOUS at 20:33

## 2024-06-20 NOTE — ED PROVIDER NOTE - CLINICAL SUMMARY MEDICAL DECISION MAKING FREE TEXT BOX
Patient with SOBOE and chest discomfort similar to past episode of dehydration. Will recheck labs, give IV fluids. Patient took PPI yesterday and today. If no emergent findings, will refer to GI for F/U

## 2024-06-20 NOTE — ED PROVIDER NOTE - PATIENT PORTAL LINK FT
You can access the FollowMyHealth Patient Portal offered by [facility] by registering at the following website: [email or web address]. By joining [employer]’s FollowMyHealth portal, you will also be able to view your health information using other applications (apps) compatible with our system.

## 2024-06-20 NOTE — ED PROVIDER NOTE - NSFOLLOWUPINSTRUCTIONS_ED_ALL_ED_FT
GERD (Gastroesophageal Reflux Disease)    WHAT YOU NEED TO KNOW:    What is gastroesophageal reflux disease (GERD)? GERD is reflux that happens more than 2 times a week for a few weeks. Reflux means acid and food in your stomach back up into your esophagus. GERD can cause other health problems over time if it is not treated.  Digestive Tract    What causes GERD? GERD often happens because the lower muscle (sphincter) of the esophagus does not close properly. The sphincter normally opens to let food into the stomach. It then closes to keep food and stomach acid in the stomach. If the sphincter does not close properly, stomach acid and food back up (reflux) into the esophagus. The following may increase your risk for GERD:    Certain foods such as spicy foods, chocolate, foods that contain caffeine, peppermint, and fried foods    Hiatal hernia    Certain medicines such as calcium channel blockers (used to treat high blood pressure), allergy medicines, sedatives, or antidepressants    Pregnancy, obesity, or scleroderma    Lying down after a meal    Drinking alcohol or smoking cigarettes  What are the signs and symptoms of GERD?    Heartburn (burning pain in your chest)    Pain after meals that spreads to your neck, jaw, or shoulder    Pain that gets better when you change positions    Bitter or acid taste in your mouth    A dry cough    Trouble swallowing or pain with swallowing    Hoarseness or a sore throat    Burping or hiccups    Feeling full soon after you start eating  How is GERD diagnosed? Your healthcare provider will ask about your symptoms and when they started. Tell your provider about other medical conditions you have, your eating habits, and your activities. You may also need any of the following:    An esophageal pH test measures the amount of stomach acid that reaches your esophagus. A small probe is used to check the amount. The probe may stay attached to the catheter. If so, the catheter is taped to your nose to hold it in place. Sometimes the probe is wireless, so the catheter is removed after the probe is placed.  Esophageal pH Test      An endoscopy is a procedure used to look at the inside of your esophagus and stomach. An endoscope is a bendable tube with a light and camera on the end. Your healthcare provider may remove a small sample of tissue and send it to a lab for tests.  Upper Endoscopy      X-ray pictures may be taken of your stomach and intestines (bowel). You may be given a chalky liquid to drink before the pictures are taken. This liquid helps your stomach and intestines show up better on the x-rays.    Pressure and function tests of your esophagus can help find problems such as a hiatal hernia.  How is GERD treated?    Medicines are used to decrease stomach acid. Medicine may also be used to help your lower esophageal sphincter and stomach contract (tighten) more.    Surgery is done to wrap the upper part of the stomach around the esophageal sphincter. This will strengthen the sphincter and prevent reflux.  How can I manage GERD?  Prevent GERD    Do not have foods or drinks that may increase heartburn. These include chocolate, peppermint, fried or fatty foods, drinks that contain caffeine, or carbonated drinks (soda). Other foods include spicy foods, onions, tomatoes, and tomato-based foods. Do not have foods or drinks that can irritate your esophagus, such as citrus fruits, juices, and alcohol.    Do not eat large meals. When you eat a lot of food at one time, your stomach needs more acid to digest it. Eat 6 small meals each day instead of 3 large ones, and eat slowly. Do not eat meals 2 to 3 hours before bedtime.    Elevate the head of your bed. Place 6-inch blocks under the head of your bed frame. You may also use more than one pillow under your head and shoulders while you sleep.    Maintain a healthy weight. If you are overweight, weight loss may help relieve symptoms of GERD.    Do not smoke. Smoking weakens the lower esophageal sphincter and increases the risk of GERD. Ask your healthcare provider for information if you currently smoke and need help to quit. E-cigarettes or smokeless tobacco still contain nicotine. Talk to your healthcare provider before you use these products.    Do not put pressure on your abdomen. Pressure pushes acid up into your esophagus. Do not wear clothing that is tight around your waist. Do not bend over. Bend at the knees if you need to pick something up.  Call your local emergency number (911 in the [place]) if:    You have severe chest pain and sudden trouble breathing.    When should I seek immediate care?    You have trouble breathing after you vomit.    You have trouble swallowing, or pain with swallowing.    Your bowel movements are black, bloody, or tarry-looking.    Your vomit looks like coffee grounds or has blood in it.  When should I call my doctor?    You feel full and cannot burp or vomit.    You vomit large amounts, or you vomit often.    You are losing weight without trying.    Your symptoms get worse or do not improve with treatment.    You have questions or concerns about your condition or care.  CARE AGREEMENT:    You have the right to help plan your care. Learn about your health condition and how it may be treated. Discuss treatment options with your healthcare providers to decide what care you want to receive. You always have the right to refuse treatment.

## 2024-06-20 NOTE — ED ADULT NURSE NOTE - CHIEF COMPLAINT QUOTE
" I have diarrhea from Tuesday to today , I was at [facility] ED - Sonogram was done and I went home. Started this morning, I have SOB worse when I walk ot talk " (+) Chest pain with exertion

## 2024-06-20 NOTE — ED ADULT NURSE NOTE - NSSEPSISNEWALTERMENTAL_ED_A_ED
Behavioral Health IP Nursing Progress Note    Suicidal Ideation: Denies     Current C-SSRS score: Negative Screen - White (10/11/23 0900)      Protective Factors / Reason for Living: Positive therapeutic relationships, Social supports    Interventions:   · q15 minute safety checks  · 1:1 assessment     Subjective: Patient reported feeling good today. She reports groups are going well and she is working on finishing her CBT book with the help of another group member. She denies SI, SH urges, HI, or AVH. She denies feeling upset or irritated and reported her mood is good. She denied any other questions or concerns.     Objective:   Mental Health: Patient behavior observed to be calm and cooperative. Affect was observed to be anxious and constricted. Patient was up ad dequan on the unit. She participated in group therapy throughout the day and was social, appropriate, and respectful with peers and staff. No unsafe or agitated behaviors observed.      Medical:   • None this shift     Assessment / Actions: 1:1 assessment completed. Patient was encouraged to attend group therapy throughout the day. Patient was encouraged to participate in self-care activities during scheduled times. They were encouraged to maintain appropriate and respectful behaviors on the unit.   PRN Medications given?   No    Plan:   Treatment Plan reviewed.         No

## 2024-06-20 NOTE — ED PROVIDER NOTE - CARE PROVIDER_API CALL
Shravan Lora  Cardiology  [address], [address]  [place] [address]  Phone: [phone]  Fax: [phone]  Follow Up Time: 1-3 Days    Reilly Mason  Gastroenterology  [address] [place] [address]  Phone: [phone]  Fax: [phone]  Follow Up Time:

## 2024-06-20 NOTE — ED ADULT TRIAGE NOTE - CHIEF COMPLAINT QUOTE
" I have diarrhea from Tuesday to today , I was at [facility] ED - Sonogram was done and I went home. Started this morning, I have SOB worse when I walk ot talk " (+) Chest pain with exertion

## 2024-06-20 NOTE — ED ADULT NURSE NOTE - OBJECTIVE STATEMENT
diarrhea from Tuesday to today ,  was at [facility] ED - since this morning, I have SOB worse when I walk ot talk " (+) Chest pain with exertion  shortness of breath

## 2024-06-20 NOTE — ED PROVIDER NOTE - OBJECTIVE STATEMENT
Patient states she developed an attack of gastritis 2 days ago. Started a few days after eating and drinking more than usual at a party in [place]. Was seen at [place] yesterday. Had labs and sonogram. Felt a little better so went home. This am, abd pain improved, but patient states she feels "winded" on exertion. Some chest discomfort. Had the same thing 6 months ago and was diagnosed with dehydration. No fever. No urinary c/o. LMP now

## 2024-06-22 LAB
CULTURE RESULTS: SIGNIFICANT CHANGE UP
SPECIMEN SOURCE: SIGNIFICANT CHANGE UP

## 2024-09-10 ENCOUNTER — NON-APPOINTMENT (OUTPATIENT)
Age: 34
End: 2024-09-10

## 2024-10-14 ENCOUNTER — APPOINTMENT (OUTPATIENT)
Dept: ORTHOPEDIC SURGERY | Facility: CLINIC | Age: 34
End: 2024-10-14
Payer: COMMERCIAL

## 2024-10-14 VITALS — HEIGHT: 62 IN | BODY MASS INDEX: 24.29 KG/M2 | WEIGHT: 132 LBS

## 2024-10-14 DIAGNOSIS — S93.491A SPRAIN OF OTHER LIGAMENT OF RIGHT ANKLE, INITIAL ENCOUNTER: ICD-10-CM

## 2024-10-14 PROCEDURE — L4350: CPT | Mod: RT

## 2024-10-14 PROCEDURE — 99203 OFFICE O/P NEW LOW 30 MIN: CPT | Mod: 25

## 2024-10-14 PROCEDURE — 73610 X-RAY EXAM OF ANKLE: CPT | Mod: RT

## 2025-02-24 ENCOUNTER — TRANSCRIPTION ENCOUNTER (OUTPATIENT)
Age: 35
End: 2025-02-24

## 2025-04-02 ENCOUNTER — APPOINTMENT (OUTPATIENT)
Dept: ORTHOPEDIC SURGERY | Facility: CLINIC | Age: 35
End: 2025-04-02
Payer: COMMERCIAL

## 2025-04-02 VITALS — WEIGHT: 130 LBS | HEIGHT: 61 IN | BODY MASS INDEX: 24.55 KG/M2

## 2025-04-02 DIAGNOSIS — M72.2 PLANTAR FASCIAL FIBROMATOSIS: ICD-10-CM

## 2025-04-02 PROCEDURE — 73630 X-RAY EXAM OF FOOT: CPT | Mod: LT

## 2025-04-02 PROCEDURE — 99214 OFFICE O/P EST MOD 30 MIN: CPT

## 2025-04-08 NOTE — PRE-ANESTHESIA EVALUATION ADULT - NSANTHOSAYNRD_GEN_A_CORE
Anesthesia Volume In Cc: 0.5 Consent: The patient's consent was obtained including but not limited to risks of crusting, scabbing, blistering, scarring, darker or lighter pigmentary change, recurrence, incomplete removal and infection. - - - Post-Care Instructions: I reviewed with the patient in detail post-care instructions. Patient is to wear sunprotection, and avoid picking at any of the treated lesions. Pt may apply Vaseline to crusted or scabbing areas. Medical Necessity Clause: This procedure was medically necessary because the lesions that were treated were: Treatment Number (Will Not Render If 0): 0 Medical Necessity Information: It is in your best interest to select a reason for this procedure from the list below. All of these items fulfill various CMS LCD requirements except the new and changing color options. Render Post-Care Instructions In Note?: no No. DUSTIN screening performed.  STOP BANG Legend: 0-2 = LOW Risk; 3-4 = INTERMEDIATE Risk; 5-8 = HIGH Risk Detail Level: Detailed

## 2025-04-23 ENCOUNTER — APPOINTMENT (OUTPATIENT)
Dept: ORTHOPEDIC SURGERY | Facility: CLINIC | Age: 35
End: 2025-04-23
Payer: COMMERCIAL

## 2025-04-23 DIAGNOSIS — M72.2 PLANTAR FASCIAL FIBROMATOSIS: ICD-10-CM

## 2025-04-23 DIAGNOSIS — M79.671 PAIN IN RIGHT FOOT: ICD-10-CM

## 2025-04-23 PROCEDURE — 73600 X-RAY EXAM OF ANKLE: CPT | Mod: RT

## 2025-04-23 PROCEDURE — 99214 OFFICE O/P EST MOD 30 MIN: CPT

## 2025-04-23 PROCEDURE — 73630 X-RAY EXAM OF FOOT: CPT | Mod: RT

## 2025-05-21 ENCOUNTER — APPOINTMENT (OUTPATIENT)
Dept: ORTHOPEDIC SURGERY | Facility: CLINIC | Age: 35
End: 2025-05-21

## 2025-05-22 ENCOUNTER — APPOINTMENT (OUTPATIENT)
Dept: ORTHOPEDIC SURGERY | Facility: CLINIC | Age: 35
End: 2025-05-22
Payer: COMMERCIAL

## 2025-05-22 DIAGNOSIS — M79.644 PAIN IN RIGHT FINGER(S): ICD-10-CM

## 2025-05-22 PROCEDURE — 73140 X-RAY EXAM OF FINGER(S): CPT | Mod: RT

## 2025-05-22 PROCEDURE — 99203 OFFICE O/P NEW LOW 30 MIN: CPT

## 2025-05-30 ENCOUNTER — APPOINTMENT (OUTPATIENT)
Dept: MRI IMAGING | Facility: CLINIC | Age: 35
End: 2025-05-30
Payer: COMMERCIAL

## 2025-05-30 PROCEDURE — 73218 MRI UPPER EXTREMITY W/O DYE: CPT | Mod: RT

## 2025-06-09 ENCOUNTER — APPOINTMENT (OUTPATIENT)
Dept: ORTHOPEDIC SURGERY | Facility: CLINIC | Age: 35
End: 2025-06-09
Payer: COMMERCIAL

## 2025-06-09 VITALS — HEIGHT: 61 IN | WEIGHT: 130 LBS | BODY MASS INDEX: 24.55 KG/M2

## 2025-06-09 PROBLEM — M77.8 FINGER TENDINITIS: Status: ACTIVE | Noted: 2025-06-09

## 2025-06-09 PROCEDURE — 99204 OFFICE O/P NEW MOD 45 MIN: CPT

## 2025-06-09 RX ORDER — METHYLPREDNISOLONE 4 MG/1
4 TABLET ORAL
Qty: 1 | Refills: 0 | Status: ACTIVE | COMMUNITY
Start: 2025-06-09 | End: 1900-01-01

## 2025-06-30 ENCOUNTER — NON-APPOINTMENT (OUTPATIENT)
Age: 35
End: 2025-06-30

## 2025-06-30 ENCOUNTER — APPOINTMENT (OUTPATIENT)
Dept: OBGYN | Facility: CLINIC | Age: 35
End: 2025-06-30
Payer: COMMERCIAL

## 2025-06-30 VITALS
DIASTOLIC BLOOD PRESSURE: 82 MMHG | HEART RATE: 65 BPM | WEIGHT: 131 LBS | HEIGHT: 61 IN | SYSTOLIC BLOOD PRESSURE: 124 MMHG | BODY MASS INDEX: 24.73 KG/M2

## 2025-06-30 PROCEDURE — 99459 PELVIC EXAMINATION: CPT

## 2025-06-30 PROCEDURE — 99204 OFFICE O/P NEW MOD 45 MIN: CPT

## 2025-07-24 ENCOUNTER — APPOINTMENT (OUTPATIENT)
Dept: MRI IMAGING | Facility: CLINIC | Age: 35
End: 2025-07-24
Payer: COMMERCIAL

## 2025-07-24 ENCOUNTER — OUTPATIENT (OUTPATIENT)
Dept: OUTPATIENT SERVICES | Facility: HOSPITAL | Age: 35
LOS: 1 days | End: 2025-07-24
Payer: COMMERCIAL

## 2025-07-24 DIAGNOSIS — Z00.00 ENCOUNTER FOR GENERAL ADULT MEDICAL EXAMINATION WITHOUT ABNORMAL FINDINGS: ICD-10-CM

## 2025-07-24 DIAGNOSIS — E89.0 POSTPROCEDURAL HYPOTHYROIDISM: Chronic | ICD-10-CM

## 2025-07-24 PROCEDURE — A9581: CPT

## 2025-07-24 PROCEDURE — A9585: CPT

## 2025-07-24 PROCEDURE — 72197 MRI PELVIS W/O & W/DYE: CPT | Mod: 26

## 2025-07-24 PROCEDURE — 72197 MRI PELVIS W/O & W/DYE: CPT

## 2025-08-13 ENCOUNTER — APPOINTMENT (OUTPATIENT)
Dept: OBGYN | Facility: CLINIC | Age: 35
End: 2025-08-13
Payer: COMMERCIAL

## 2025-08-13 DIAGNOSIS — D25.9 LEIOMYOMA OF UTERUS, UNSPECIFIED: ICD-10-CM

## 2025-08-13 PROCEDURE — 99213 OFFICE O/P EST LOW 20 MIN: CPT | Mod: 95

## 2025-08-20 ENCOUNTER — APPOINTMENT (OUTPATIENT)
Dept: OBGYN | Facility: CLINIC | Age: 35
End: 2025-08-20